# Patient Record
Sex: FEMALE | Race: BLACK OR AFRICAN AMERICAN | URBAN - METROPOLITAN AREA
[De-identification: names, ages, dates, MRNs, and addresses within clinical notes are randomized per-mention and may not be internally consistent; named-entity substitution may affect disease eponyms.]

---

## 2017-06-15 ENCOUNTER — OUTPATIENT (OUTPATIENT)
Dept: OUTPATIENT SERVICES | Facility: HOSPITAL | Age: 47
LOS: 1 days | Discharge: HOME | End: 2017-06-15

## 2017-06-15 DIAGNOSIS — F10.20 ALCOHOL DEPENDENCE, UNCOMPLICATED: ICD-10-CM

## 2017-06-28 DIAGNOSIS — Z00.8 ENCOUNTER FOR OTHER GENERAL EXAMINATION: ICD-10-CM

## 2017-12-11 ENCOUNTER — INPATIENT (INPATIENT)
Facility: HOSPITAL | Age: 47
LOS: 4 days | Discharge: NURSING, REHAB OR LONG-TERM CARE | End: 2017-12-16
Attending: INTERNAL MEDICINE

## 2017-12-11 DIAGNOSIS — F10.20 ALCOHOL DEPENDENCE, UNCOMPLICATED: ICD-10-CM

## 2017-12-16 ENCOUNTER — INPATIENT (INPATIENT)
Facility: HOSPITAL | Age: 47
LOS: 3 days | Discharge: HOME | End: 2017-12-20
Attending: INTERNAL MEDICINE

## 2017-12-16 DIAGNOSIS — F10.20 ALCOHOL DEPENDENCE, UNCOMPLICATED: ICD-10-CM

## 2017-12-20 DIAGNOSIS — M54.9 DORSALGIA, UNSPECIFIED: ICD-10-CM

## 2017-12-20 DIAGNOSIS — F10.20 ALCOHOL DEPENDENCE, UNCOMPLICATED: ICD-10-CM

## 2017-12-20 DIAGNOSIS — F11.20 OPIOID DEPENDENCE, UNCOMPLICATED: ICD-10-CM

## 2017-12-20 DIAGNOSIS — F31.9 BIPOLAR DISORDER, UNSPECIFIED: ICD-10-CM

## 2017-12-20 DIAGNOSIS — F17.210 NICOTINE DEPENDENCE, CIGARETTES, UNCOMPLICATED: ICD-10-CM

## 2017-12-25 DIAGNOSIS — Z51.89 ENCOUNTER FOR OTHER SPECIFIED AFTERCARE: ICD-10-CM

## 2017-12-25 DIAGNOSIS — F10.20 ALCOHOL DEPENDENCE, UNCOMPLICATED: ICD-10-CM

## 2017-12-25 DIAGNOSIS — Z59.0 HOMELESSNESS: ICD-10-CM

## 2017-12-25 DIAGNOSIS — G40.89 OTHER SEIZURES: ICD-10-CM

## 2017-12-25 DIAGNOSIS — F11.20 OPIOID DEPENDENCE, UNCOMPLICATED: ICD-10-CM

## 2017-12-25 DIAGNOSIS — M54.5 LOW BACK PAIN: ICD-10-CM

## 2017-12-25 DIAGNOSIS — F31.9 BIPOLAR DISORDER, UNSPECIFIED: ICD-10-CM

## 2017-12-25 DIAGNOSIS — F14.20 COCAINE DEPENDENCE, UNCOMPLICATED: ICD-10-CM

## 2017-12-25 DIAGNOSIS — F17.210 NICOTINE DEPENDENCE, CIGARETTES, UNCOMPLICATED: ICD-10-CM

## 2017-12-25 DIAGNOSIS — G89.29 OTHER CHRONIC PAIN: ICD-10-CM

## 2017-12-25 SDOH — ECONOMIC STABILITY - HOUSING INSECURITY: HOMELESSNESS: Z59.0

## 2018-01-23 ENCOUNTER — INPATIENT (INPATIENT)
Facility: HOSPITAL | Age: 48
LOS: 2 days | Discharge: HOME | End: 2018-01-26
Attending: INTERNAL MEDICINE

## 2018-01-30 DIAGNOSIS — S83.92XA SPRAIN OF UNSPECIFIED SITE OF LEFT KNEE, INITIAL ENCOUNTER: ICD-10-CM

## 2018-01-30 DIAGNOSIS — S80.02XA CONTUSION OF LEFT KNEE, INITIAL ENCOUNTER: ICD-10-CM

## 2018-01-30 DIAGNOSIS — F17.200 NICOTINE DEPENDENCE, UNSPECIFIED, UNCOMPLICATED: ICD-10-CM

## 2018-01-30 DIAGNOSIS — G40.909 EPILEPSY, UNSPECIFIED, NOT INTRACTABLE, WITHOUT STATUS EPILEPTICUS: ICD-10-CM
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
## 2018-01-30 DIAGNOSIS — F31.9 BIPOLAR DISORDER, UNSPECIFIED: ICD-10-CM

## 2018-01-30 DIAGNOSIS — Z59.0 HOMELESSNESS: ICD-10-CM
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
## 2018-01-30 DIAGNOSIS — Z91.19 PATIENT'S NONCOMPLIANCE WITH OTHER MEDICAL TREATMENT AND REGIMEN: ICD-10-CM
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
## 2018-01-30 DIAGNOSIS — F14.10 COCAINE ABUSE, UNCOMPLICATED: ICD-10-CM

## 2018-01-30 SDOH — ECONOMIC STABILITY - HOUSING INSECURITY: HOMELESSNESS: Z59.0

## 2018-02-04 DIAGNOSIS — G40.909 EPILEPSY, UNSPECIFIED, NOT INTRACTABLE, WITHOUT STATUS EPILEPTICUS: ICD-10-CM

## 2018-05-04 ENCOUNTER — INPATIENT (INPATIENT)
Facility: HOSPITAL | Age: 48
LOS: 3 days | Discharge: HOME | End: 2018-05-08
Attending: INTERNAL MEDICINE | Admitting: INTERNAL MEDICINE

## 2018-05-04 VITALS
DIASTOLIC BLOOD PRESSURE: 80 MMHG | RESPIRATION RATE: 18 BRPM | TEMPERATURE: 98 F | OXYGEN SATURATION: 96 % | SYSTOLIC BLOOD PRESSURE: 135 MMHG | HEART RATE: 105 BPM

## 2018-05-04 DIAGNOSIS — F11.21 OPIOID DEPENDENCE, IN REMISSION: ICD-10-CM

## 2018-05-04 LAB
ALBUMIN SERPL ELPH-MCNC: 3.9 G/DL — SIGNIFICANT CHANGE UP (ref 3.5–5.2)
ALP SERPL-CCNC: 169 U/L — HIGH (ref 30–115)
ALT FLD-CCNC: 209 U/L — HIGH (ref 0–41)
ANION GAP SERPL CALC-SCNC: 15 MMOL/L — HIGH (ref 7–14)
AST SERPL-CCNC: 110 U/L — HIGH (ref 0–41)
BASOPHILS # BLD AUTO: 0.07 K/UL — SIGNIFICANT CHANGE UP (ref 0–0.2)
BASOPHILS NFR BLD AUTO: 0.5 % — SIGNIFICANT CHANGE UP (ref 0–1)
BILIRUB SERPL-MCNC: 0.2 MG/DL — SIGNIFICANT CHANGE UP (ref 0.2–1.2)
BUN SERPL-MCNC: 17 MG/DL — SIGNIFICANT CHANGE UP (ref 10–20)
CALCIUM SERPL-MCNC: 9.2 MG/DL — SIGNIFICANT CHANGE UP (ref 8.5–10.1)
CHLORIDE SERPL-SCNC: 101 MMOL/L — SIGNIFICANT CHANGE UP (ref 98–110)
CO2 SERPL-SCNC: 25 MMOL/L — SIGNIFICANT CHANGE UP (ref 17–32)
CREAT SERPL-MCNC: 0.8 MG/DL — SIGNIFICANT CHANGE UP (ref 0.7–1.5)
EOSINOPHIL # BLD AUTO: 0.21 K/UL — SIGNIFICANT CHANGE UP (ref 0–0.7)
EOSINOPHIL NFR BLD AUTO: 1.6 % — SIGNIFICANT CHANGE UP (ref 0–8)
GLUCOSE SERPL-MCNC: 106 MG/DL — HIGH (ref 70–99)
HCT VFR BLD CALC: 36.9 % — LOW (ref 37–47)
HGB BLD-MCNC: 12.2 G/DL — SIGNIFICANT CHANGE UP (ref 12–16)
IMM GRANULOCYTES NFR BLD AUTO: 0.5 % — HIGH (ref 0.1–0.3)
LYMPHOCYTES # BLD AUTO: 2.66 K/UL — SIGNIFICANT CHANGE UP (ref 1.2–3.4)
LYMPHOCYTES # BLD AUTO: 20.8 % — SIGNIFICANT CHANGE UP (ref 20.5–51.1)
MCHC RBC-ENTMCNC: 29.2 PG — SIGNIFICANT CHANGE UP (ref 27–31)
MCHC RBC-ENTMCNC: 33.1 G/DL — SIGNIFICANT CHANGE UP (ref 32–37)
MCV RBC AUTO: 88.3 FL — SIGNIFICANT CHANGE UP (ref 81–99)
MONOCYTES # BLD AUTO: 0.91 K/UL — HIGH (ref 0.1–0.6)
MONOCYTES NFR BLD AUTO: 7.1 % — SIGNIFICANT CHANGE UP (ref 1.7–9.3)
NEUTROPHILS # BLD AUTO: 8.86 K/UL — HIGH (ref 1.4–6.5)
NEUTROPHILS NFR BLD AUTO: 69.5 % — SIGNIFICANT CHANGE UP (ref 42.2–75.2)
NRBC # BLD: 0 /100 WBCS — SIGNIFICANT CHANGE UP (ref 0–0)
PLATELET # BLD AUTO: 369 K/UL — SIGNIFICANT CHANGE UP (ref 130–400)
POTASSIUM SERPL-MCNC: 3.9 MMOL/L — SIGNIFICANT CHANGE UP (ref 3.5–5)
POTASSIUM SERPL-SCNC: 3.9 MMOL/L — SIGNIFICANT CHANGE UP (ref 3.5–5)
PROT SERPL-MCNC: 7.8 G/DL — SIGNIFICANT CHANGE UP (ref 6–8)
RBC # BLD: 4.18 M/UL — LOW (ref 4.2–5.4)
RBC # FLD: 14.1 % — SIGNIFICANT CHANGE UP (ref 11.5–14.5)
SODIUM SERPL-SCNC: 141 MMOL/L — SIGNIFICANT CHANGE UP (ref 135–146)
WBC # BLD: 12.78 K/UL — HIGH (ref 4.8–10.8)
WBC # FLD AUTO: 12.78 K/UL — HIGH (ref 4.8–10.8)

## 2018-05-04 RX ORDER — NAFCILLIN 10 G/100ML
INJECTION, POWDER, FOR SOLUTION INTRAVENOUS
Qty: 0 | Refills: 0 | Status: DISCONTINUED | OUTPATIENT
Start: 2018-05-05 | End: 2018-05-08

## 2018-05-04 RX ORDER — NAFCILLIN 10 G/100ML
INJECTION, POWDER, FOR SOLUTION INTRAVENOUS
Qty: 0 | Refills: 0 | Status: DISCONTINUED | OUTPATIENT
Start: 2018-05-04 | End: 2018-05-04

## 2018-05-04 RX ORDER — VANCOMYCIN HCL 1 G
2000 VIAL (EA) INTRAVENOUS ONCE
Qty: 0 | Refills: 0 | Status: COMPLETED | OUTPATIENT
Start: 2018-05-04 | End: 2018-05-04

## 2018-05-04 RX ORDER — QUETIAPINE FUMARATE 200 MG/1
25 TABLET, FILM COATED ORAL THREE TIMES A DAY
Qty: 0 | Refills: 0 | Status: DISCONTINUED | OUTPATIENT
Start: 2018-05-04 | End: 2018-05-08

## 2018-05-04 RX ORDER — IBUPROFEN 200 MG
400 TABLET ORAL THREE TIMES A DAY
Qty: 0 | Refills: 0 | Status: DISCONTINUED | OUTPATIENT
Start: 2018-05-04 | End: 2018-05-08

## 2018-05-04 RX ORDER — PHENOBARBITAL 60 MG
1 TABLET ORAL
Qty: 0 | Refills: 0 | COMMUNITY

## 2018-05-04 RX ORDER — QUETIAPINE FUMARATE 200 MG/1
150 TABLET, FILM COATED ORAL AT BEDTIME
Qty: 0 | Refills: 0 | Status: DISCONTINUED | OUTPATIENT
Start: 2018-05-04 | End: 2018-05-08

## 2018-05-04 RX ORDER — NAFCILLIN 10 G/100ML
2 INJECTION, POWDER, FOR SOLUTION INTRAVENOUS EVERY 4 HOURS
Qty: 0 | Refills: 0 | Status: DISCONTINUED | OUTPATIENT
Start: 2018-05-05 | End: 2018-05-08

## 2018-05-04 RX ORDER — OXACILLIN SODIUM 2 G
INTRAVENOUS SOLUTION, PIGGYBACK (EA) INTRAVENOUS
Qty: 0 | Refills: 0 | Status: DISCONTINUED | OUTPATIENT
Start: 2018-05-04 | End: 2018-05-04

## 2018-05-04 RX ORDER — NAFCILLIN 10 G/100ML
2 INJECTION, POWDER, FOR SOLUTION INTRAVENOUS ONCE
Qty: 0 | Refills: 0 | Status: COMPLETED | OUTPATIENT
Start: 2018-05-04 | End: 2018-05-04

## 2018-05-04 RX ORDER — LEVETIRACETAM 250 MG/1
2.5 TABLET, FILM COATED ORAL
Qty: 0 | Refills: 0 | COMMUNITY

## 2018-05-04 RX ORDER — TRAMADOL HYDROCHLORIDE 50 MG/1
50 TABLET ORAL THREE TIMES A DAY
Qty: 0 | Refills: 0 | Status: DISCONTINUED | OUTPATIENT
Start: 2018-05-04 | End: 2018-05-08

## 2018-05-04 RX ORDER — NICOTINE POLACRILEX 2 MG
1 GUM BUCCAL DAILY
Qty: 0 | Refills: 0 | Status: DISCONTINUED | OUTPATIENT
Start: 2018-05-04 | End: 2018-05-08

## 2018-05-04 RX ORDER — PANTOPRAZOLE SODIUM 20 MG/1
40 TABLET, DELAYED RELEASE ORAL
Qty: 0 | Refills: 0 | Status: DISCONTINUED | OUTPATIENT
Start: 2018-05-04 | End: 2018-05-08

## 2018-05-04 RX ORDER — QUETIAPINE FUMARATE 200 MG/1
1 TABLET, FILM COATED ORAL
Qty: 0 | Refills: 0 | COMMUNITY

## 2018-05-04 RX ORDER — LIDOCAINE 4 G/100G
1 CREAM TOPICAL DAILY
Qty: 0 | Refills: 0 | Status: DISCONTINUED | OUTPATIENT
Start: 2018-05-04 | End: 2018-05-08

## 2018-05-04 RX ORDER — ENOXAPARIN SODIUM 100 MG/ML
40 INJECTION SUBCUTANEOUS EVERY 24 HOURS
Qty: 0 | Refills: 0 | Status: DISCONTINUED | OUTPATIENT
Start: 2018-05-04 | End: 2018-05-08

## 2018-05-04 RX ORDER — LEVETIRACETAM 250 MG/1
500 TABLET, FILM COATED ORAL
Qty: 0 | Refills: 0 | Status: DISCONTINUED | OUTPATIENT
Start: 2018-05-04 | End: 2018-05-08

## 2018-05-04 RX ADMIN — Medication 250 MILLIGRAM(S): at 22:16

## 2018-05-04 RX ADMIN — NAFCILLIN 200 GRAM(S): 10 INJECTION, POWDER, FOR SOLUTION INTRAVENOUS at 21:46

## 2018-05-04 NOTE — ED PROVIDER NOTE - OBJECTIVE STATEMENT
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 Pt states she was being treated for the staph infection and is due to receive IV abx through May 21 at which point she will need a valve Pt left [facility] because her IV came out and she wasn't happy with how long it was taking them to place a PICC line. Pt also states they have not been treating her Hep C.

## 2018-05-04 NOTE — H&P ADULT - NSHPREVIEWOFSYSTEMS_GEN_ALL_CORE
REVIEW OF SYSTEMS:    CONSTITUTIONAL: No weakness or fevers  EYES/ENT: No visual changes  NECK: No pain or stiffness  RESPIRATORY: No cough, wheezing, hemoptysis; No shortness of breath  CARDIOVASCULAR: No chest pain or palpitations, no lower extremity edema  GASTROINTESTINAL: No abdominal pain. No nausea or vomiting; No diarrhea or constipation. No bloody or black colored stool  GENITOURINARY: No pain with urination, no increased urinary frequency, no dark o r bloody urine  NEUROLOGICAL: No numbness or weakness  SKIN: No itching, rashes

## 2018-05-04 NOTE — H&P ADULT - ATTENDING COMMENTS
Agree with resident's note, HPI, PE, assessment and plan.  Pt was seen and examined independently.     Record from [facility] reviewed.    48/F hx of IVDU and cocaine use,  Bipolar d/o, seizure d/o, Hep C, recent admission (discharged 05/03) to [facility] found to have IE (TV) and left AMA b/c were delaying PICC line placement. Pt states right after she left the hospital she had another fever and decided to come to another hospital.   Currently no complaints, denies cough, no more fever, chills.     ROS: + RUQ pain, rest of ROS negative.    PMHx, FHx, Surgical Hx, Social Hx - as documented in resident note    T(C): 36  HR: 114  BP: 134/57  RR: 20  SpO2: 96%    Physical exam:  GENERAL: NAD, not toxic looking  HEENT: PERRL, no Li's spots  NECK: supple, no JVD  RESP: CTA b/l, no crackles, rhonchi  CVS: S1S2,RRR  GI: abdomen soft NT, ND  Extremities: no c/c/edema, no endocarditis stigmata  NEURO: AOx3, no focal deficit  H/L: no enlarged LN noted     LABS:                       11.1   9.32  )-----------( 337      ( 05 May 2018 06:47 )             33.4   05-05    139  |  102  |  18  ----------------------------<  103<H>  4.1   |  23  |  0.7    Ca    8.5      05 May 2018 06:47  Phos  3.9     05-05  Mg     1.8     05-05    TPro  6.3  /  Alb  3.2<L>  /  TBili  0.2  /  DBili  x   /  AST  65<H>  /  ALT  148<H>  /  AlkPhos  147<H>  05-05    CXR: Right middle lobe opacity, likely infectious or inflammatory. Follow-up   after treatment is recommended to demonstrate resolution.  CT chest from [facility]: septic embolies     EKG: not done    A/P: 1. IE in IVDU user, confirmed with VIOLETA.   2. Bacteremia with MSSA  - repeat BCx x2  - cont Nafcillin  - ID eval  3. IVDU - avoid IV opioids, utox  4. Newly diagnosed Hep C, treatment naive (as per pt) - GI eval as OP  - US abd/liver  - AFP  5.  Seizure disorder - cont home meds  6. Bipolar disorder - cont home meds    DVT ppx.

## 2018-05-04 NOTE — H&P ADULT - REASON FOR ADMISSION
Pt states she was admitted Pt at [facility] for IV abx due to endocarditis, she left AMA due to their inability to obtain IV access and they refused to place PICC line.

## 2018-05-04 NOTE — H&P ADULT - NSHPPHYSICALEXAM_GEN_ALL_CORE
· Temp (F): 98.5  · Temp (C) Temp (C): 36.9  · Temp site Temp Site: oral  · Heart Rate Heart Rate (beats/min): 105  · Heart Rate Method: noninvasive blood pressure monitor  · BP Systolic Systolic: 135  · BP Diastolic Diastolic (mm Hg): 80  · Blood Pressure - Site Site: left upper arm  · Blood Pressure - Method Method: electronic  · Respiration Rate (breaths/min) Respiration Rate (breaths/min): 18  · SpO2 (%) SpO2 (%): 96  · O2 delivery Patient On: room air · Temp (F): 98.5  · Temp (C) Temp (C): 36.9  · Temp site Temp Site: oral  · Heart Rate Heart Rate (beats/min): 105  · Heart Rate Method: noninvasive blood pressure monitor  · BP Systolic Systolic: 135  · BP Diastolic Diastolic (mm Hg): 80  · Blood Pressure - Site Site: left upper arm  · Blood Pressure - Method Method: electronic  · Respiration Rate (breaths/min) Respiration Rate (breaths/min): 18  · SpO2 (%) SpO2 (%): 96  · O2 delivery Patient On: room air    PHYSICAL EXAM:  GENERAL: NAD, speaks in full sentences, no signs of respiratory distress  HEAD:  Atraumatic, Normocephalic  EYES: EOMI, PERRLA, conjunctiva and sclera clear  NECK: Supple, No JVD  CHEST/LUNG: Clear to auscultation bilaterally; No wheeze; No crackles; No accessory muscles used  HEART: Regular rate and rhythm; No murmurs;   ABDOMEN: Soft, mild ruq tenderness, Nondistended; Bowel sounds present; No guarding  EXTREMITIES:  2+ Peripheral Pulses, No cyanosis or edema  PSYCH: AAOx3  NEUROLOGY: non-focal  SKIN: No rashes or lesions

## 2018-05-04 NOTE — ED ADULT NURSE NOTE - CHIEF COMPLAINT QUOTE
Pt states she was admitted Pt at [facility] for IV abx due to endocarditis, she left AMA due to their inability to obtain IV access and they refused to place PICC line.

## 2018-05-04 NOTE — H&P ADULT - ASSESSMENT
48/F hx of Bipolar d/o, seizure d/o, Hep C, recent admission to [facility] found to have IE (TV) and left AMA. Admit to medicine for management of her IE, found to have acute transaminitis    # Infectious Endocarditis likely 2/2 IVDU  - Mild Leukoocytosis  - Check ESR & CRP  - Check TTE  - Check HIV screen  - Check Blood Cx  - ID evaluation    # RML opacity    # Transaminitis with Hx of Hep C  - 2017 AST/ALT (28/20)  - Check RUQ sono  - Albumin WNR  - Repeat levels; check coags daily  - Hep Panel ABC (2017) - neg    # Seizure d/o  - C/w Keppra & Phenobarbital     # Bipolar d/o  - C/w seroquel & paxil    # IVDU  - check ur & drug screen  - Prev screen positive: Bz & cocaine & THC & Opiates    Ambulate as tolerated  DVT ppx  Regular Diet  Full Code 48/F hx of IVDU (heroine last 12/2017) and cocaine use (last 3 days PTP),  Bipolar d/o, seizure d/o, Hep C, recent admission to [facility] found to have IE (TV) and left AMA b/c were delaying PICC line placement.    # Infectious Endocarditis likely 2/2 IVDU  - [facility] documented bld cx MSSA sensitive to Oxacillin (<=0.25); doc on 2 blood cx  - VIOLETA report from [facility] =   - Per [facility]: Rx with Oxacillin 2g IV q4H: 1st dose 4/9/18, last dose 5/3/18, rec 6wk course to be completed on 5/21/18   - Mild Leukoocytosis  - Check ESR & CRP  - Check TTE  - Check HIV screen  - Check Blood Cx  - ID evaluation    # RML opacity    # Transaminitis with Hx of Hep C  - 2017 AST/ALT (28/20)  - Check RUQ sono  - Albumin WNR  - Repeat levels; check coags daily  - Hep Panel ABC (2017) - neg    # Seizure d/o  - C/w Keppra & Phenobarbital     # Bipolar d/o  - C/w seroquel & paxil    # IVDU  - check ur & drug screen  - Prev screen positive: Bz & cocaine & THC & Opiates    Ambulate as tolerated  DVT ppx  Regular Diet  Full Code 48/F hx of IVDU (heroine last 12/2017) and cocaine use (last 3 days PTP),  Bipolar d/o, seizure d/o, Hep C, recent admission to [facility] (4/8/2018) found to have IE (TV) and left AMA (5/3/18) b/c were delaying PICC line placement. Admit to Medicine for management MSSA bacteremia w/ Tricuspid Valve IE & Hep C & Transaminitis    # Infectious Endocarditis likely 2/2 IVDU  - [facility] documented bld cx MSSA sensitive to Oxacillin (<=0.25); doc on 2 blood cx  - VIOLETA report from [facility] (4/26/18) = Tricupid valve - 4+ TR (Severe). Erratically moving, tissue density mass on TV c/w vegetation. Flail tricuspid valve leaflet  - Per [facility]: Rx with Oxacillin 2g IV q4H for native valve IE 2/2 MSSA: 1st dose 4/9/18, last dose 5/3/18, rec 6wk course to be completed on 5/21/18   - Mild Leukoocytosis  - Check ESR & CRP  - Check TTE for imaging  - Check HIV screen  - Check Blood Cx  - ID evaluation  - Consider CT surgery evaluation     # Tooth extraction while at [facility] (5/1/18) for long term tooth pain & possible source of infection  - C/w Ketorolac & naproxen    # RML opacity    # Transaminitis with Hx of Hep C  - 2017 AST/ALT (28/20)  - Check RUQ sono  - Albumin WNR  - Repeat levels; check coags daily  - Hep Panel ABC (2017) - neg    # Seizure d/o  - C/w Keppra & Phenobarbital     # Bipolar d/o  - C/w seroquel & paxil    # IVDU Hx   - Per [facility] had +UDS for barbituates  - Last stated cocaine use was 5/1/18 & last Heroine use (IV) 12/2017  - check ur & drug screen  - Prev screen positive: Bz & cocaine & THC & Opiates    She was seen by psychiatry at [facility] and deemed to have capacity to leave AMA.  Copy of [facility] documents copied into chart.    Ambulate as tolerated  DVT ppx  Regular Diet  Full Code 48/F hx of IVDU (heroine last 12/2017) and cocaine use (last 3 days PTP),  Bipolar d/o, seizure d/o, Hep C, recent admission to [facility] (4/8/2018) found to have IE (TV) and left AMA (5/3/18) b/c were delaying PICC line placement. Admit to Medicine for management MSSA bacteremia w/ Tricuspid Valve IE & Hep C & Transaminitis    # Infectious Endocarditis likely 2/2 IVDU  - [facility] documented bld cx MSSA sensitive to Oxacillin (<=0.25); doc on 2 blood cx; prog note at [facility] has doc 3 days of negative blood cx  - VIOLETA report from [facility] (4/26/18) = Tricupid valve - 4+ TR (Severe). Erratically moving, tissue density mass on TV c/w vegetation. Flail tricuspid valve leaflet  - Per [facility]: Rx with Oxacillin 2g IV q4H for native valve IE 2/2 MSSA: 1st dose 4/9/18, last dose 5/3/18, rec 6wk course to be completed on 5/21/18   - Mild Leukoocytosis  - Check ESR & CRP  - Check TTE for imaging  - Check HIV screen  - Check Blood Cx  - ID evaluation  - Consider CT surgery evaluation     # Tooth extraction while at [facility] (5/1/18) for long term tooth pain & possible source of infection  - C/w Ketorolac & motrin    # RML opacity - no respiratory symptoms, [facility] doc shows had septic emboli to b/l lungs   - CXR - Right middle lobe opacity, likely infectious or inflammatory    # Transaminitis with Hx of Hep C  - 2017 AST/ALT (28/20); labs from 5/3/18 ALT/AST (191/79)  - Check RUQ sono  - Albumin WNR  - Repeat levels; check coags daily  - Hep Panel ABC (2017) - neg    # Seizure d/o  - C/w Keppra; was prev on Phenobarbital, but not per [facility] tx plan    # Bipolar d/o  - C/w seroquel & paxil    # IVDU Hx   - Per [facility] had +UDS for barbituates & cocaine  - Last stated cocaine use was 5/1/18 & last Heroine use (IV) 12/2017  - check ur & drug screen  - Prev screen positive: Bz & cocaine & THC & Opiates  - Counciled on avoidance of recreational drugs    # Counciled on smoking cessation  - C/w Nicotine patch from [facility] - 14-mg patch once daily for 2 weeks (ordered - ends 5/18) , then start 7-mg patch once daily for 2 weeks.    She was seen by psychiatry at [facility] and deemed to have capacity to leave AMA.  Also documented, BF banned from seeing pt & pt banned from leaving floor - concerned for drug use in the hospital. Monitor and set limits as appropriat  Copy of [facility] documents copied into chart.    Ambulate as tolerated  DVT ppx  GI ppx  Regular Diet  Full Code 48/F hx of IVDU (heroine last 12/2017) and cocaine use (last 3 days PTP),  Bipolar d/o, seizure d/o, Hep C, recent admission to [facility] (4/8/2018) found to have IE (TV) and left AMA (5/3/18) b/c were delaying PICC line placement. Admit to Medicine for management MSSA bacteremia w/ Tricuspid Valve IE & Hep C & Transaminitis    # Infectious Endocarditis likely 2/2 IVDU  - [facility] documented bld cx MSSA sensitive to Oxacillin (<=0.25); doc on 2 blood cx; prog note at [facility] has doc 3 days of negative blood cx  - VIOLETA report from [facility] (4/26/18) = Tricupid valve - 4+ TR (Severe). Erratically moving, tissue density mass on TV c/w vegetation. Flail tricuspid valve leaflet  - Per [facility]: Rx with Oxacillin 2g IV q4H for native valve IE 2/2 MSSA: 1st dose 4/9/18, last dose 5/3/18, rec 6wk course to be completed on 5/21/18   - Mild Leukoocytosis  - Check ESR & CRP  - Check TTE for imaging  - Check HIV screen  - Check Blood Cx  - ID evaluation  - Consider CT surgery evaluation     # Tooth extraction while at [facility] (5/1/18) for long term tooth pain & possible source of infection  - C/w Ketorolac & motrin    # RML opacity - no respiratory symptoms, [facility] doc shows had septic emboli to b/l lungs   - CXR - Right middle lobe opacity, likely infectious or inflammatory    # Transaminitis with Hx of Hep C  - 2017 AST/ALT (28/20); labs from 5/3/18 ALT/AST (191/79)  - Check RUQ sono  - Albumin WNR  - Repeat levels; check coags daily  - Hep Panel ABC (2017) - neg    # Seizure d/o  - C/w Keppra; was prev on Phenobarbital, but not per [facility] tx plan    # Bipolar d/o  - C/w seroquel & paxil  - EKG ordered for QTc    # IVDU Hx   - Per [facility] had +UDS for barbituates & cocaine  - Last stated cocaine use was 5/1/18 & last Heroine use (IV) 12/2017  - check ur & drug screen  - Prev screen positive: Bz & cocaine & THC & Opiates  - Counciled on avoidance of recreational drugs    # Counciled on smoking cessation  - C/w Nicotine patch from [facility] - 14-mg patch once daily for 2 weeks (ordered - ends 5/18) , then start 7-mg patch once daily for 2 weeks.    She was seen by psychiatry at [facility] and deemed to have capacity to leave AMA.  Also documented, BF banned from seeing pt & pt banned from leaving floor - concerned for drug use in the hospital. Monitor and set limits as appropriat  Copy of [facility] documents copied into chart.    Ambulate as tolerated  DVT ppx  GI ppx  Regular Diet  Full Code

## 2018-05-04 NOTE — H&P ADULT - NSHPLABSRESULTS_GEN_ALL_CORE
12.2   12.78 )-----------( 369      ( 04 May 2018 19:56 )             36.9       05-04    141  |  101  |  17  ----------------------------<  106<H>  3.9   |  25  |  0.8    Ca    9.2      04 May 2018 19:56    TPro  7.8  /  Alb  3.9  /  TBili  0.2  /  DBili  x   /  AST  110<H>  /  ALT  209<H>  /  AlkPhos  169<H>  05-04    CXR - Right middle lobe opacity, likely infectious or inflammatory 12.2   12.78 )-----------( 369      ( 04 May 2018 19:56 )             36.9       05-04    141  |  101  |  17  ----------------------------<  106<H>  3.9   |  25  |  0.8    Ca    9.2      04 May 2018 19:56    TPro  7.8  /  Alb  3.9  /  TBili  0.2  /  DBili  x   /  AST  110<H>  /  ALT  209<H>  /  AlkPhos  169<H>  05-04    CXR - Right middle lobe opacity, likely infectious or inflammatory    VIOLETA (4/26/18): EF 60-65%  Aortic valve - No AR. AV opening NML. No AV vegitation.   Mitral valve - trace MR. No vegetations.  Tricupid valve - 4+ TR (Severe). Erratically moving, tissue density mass on TV c/w vegetation. Flail tricuspid valve leaflet  Pulmonic valve - NML  Small pericardial effusion. Mild Pulm HTN. 12.2   12.78 )-----------( 369      ( 04 May 2018 19:56 )             36.9       05-04    141  |  101  |  17  ----------------------------<  106<H>  3.9   |  25  |  0.8    Ca    9.2      04 May 2018 19:56    TPro  7.8  /  Alb  3.9  /  TBili  0.2  /  DBili  x   /  AST  110<H>  /  ALT  209<H>  /  AlkPhos  169<H>  05-04    CXR - Right middle lobe opacity, likely infectious or inflammatory    VIOLETA (4/26/18):   EF 60-65%  Aortic valve - No AR. AV opening NML. No AV vegitation.   Mitral valve - trace MR. No vegetations.  Tricupid valve - 4+ TR (Severe). Erratically moving, tissue density mass on TV c/w vegetation. Flail tricuspid valve leaflet  Pulmonic valve - NML  Small pericardial effusion. Mild Pulm HTN.

## 2018-05-04 NOTE — ED ADULT TRIAGE NOTE - CHIEF COMPLAINT QUOTE
Pt states she was admitted Pt at [facility] for IV abx due to endocarditis, she left AMA due to their inability to obtain IV access and they refused to place PICC line.

## 2018-05-04 NOTE — ED ADULT NURSE NOTE - PMH
Bipolar disorder, current episode mixed, severe, without psychotic features    Hep C w/o coma, chronic    IV drug user    Seizure

## 2018-05-04 NOTE — PATIENT PROFILE ADULT. - NSTOBACCOWITHDRW_GEN_A_CORE_SD
depression/difficulty concentrating/headache/insomnia/intense desire for tobacco/irritability/nausea/bowel pattern changes/drowsiness/increased appetite

## 2018-05-04 NOTE — ED ADULT NURSE NOTE - OBJECTIVE STATEMENT
Pt was a patient at [facility] since April 9, 2018 for chest pain, dx with endocarditis. Pt had several IV's which became infiltrated, as per pt. Pt was Pt aox3 was a patient at [facility] since April 9, 2018 for chest pain, dx with endocarditis. Pt had several IV's which became infiltrated, as per pt. Pt states she was requesting a PICC line and it was not placed, so pt left AMA. Pt c/o mild chest pain, in no acute distress, will continue to monitor the pt.

## 2018-05-04 NOTE — ED PROVIDER NOTE - NS ED ROS FT
Constitutional: See HPI.  Eyes: No visual changes, eye pain or discharge.  ENMT: No hearing changes, pain, discharge or infections. No neck pain or stiffness.  Cardiac: +R chest pain. No SOB or edema. No chest pain with exertion.  Respiratory: No cough or respiratory distress.   GI: No nausea, vomiting, diarrhea. +RUQ abdominal pain.  : No dysuria, frequency or burning.  MS: No myalgia, muscle weakness, joint pain. +R back pain.  Neuro: No headache or weakness. No LOC.  Skin: No skin rash.

## 2018-05-04 NOTE — ED PROVIDER NOTE - PHYSICAL EXAMINATION
AOx4, Non toxic appearing, NAD, speaking in full sentences. Skin  warm and dry, no acute rash. Head normocephalic, atraumatic. Conjunctiva and sclera clear. MM moist, no nasal discharge.  Pharynx and TM's unremarkable.  No mastoid or temporal ttp. Neck supple nt, no meningeal signs. Heart RRR s1s2 nl, no rub/murmur. Lungs- No retractions, BS equal, CTAB. Abdomen soft mildy ttp in RUQ. Extremities- sensation wnl, normal ROM. No LE edema, calves nttp b/l.

## 2018-05-04 NOTE — H&P ADULT - HISTORY OF PRESENT ILLNESS
48/F hx of IVDU,  Bipolar d/o, seizure d/o, Hep C, recent admission to [facility] beds at the [facility] found to have IE (TV) and left AMA. Admit to medicine for management of her IE 48/F hx of IVDU (heroine last 12/2017) and cocaine use (last 3 days PTP),  Bipolar d/o, seizure d/o, Hep C, recent admission to [facility] found to have IE (TV) and left AMA b/c were delaying PICC line placement. 48/F hx of IVDU (heroine last 12/2017) and cocaine use (last 3 days PTP),  Bipolar d/o, seizure d/o, Hep C, recent admission to [facility] found to have IE (TV) and left AMA b/c were delaying PICC line placement.     In the beginning 4/2018 - pt had a spider bite to right foot which turned into an infection at the site (now resolved), then had fevers and chest pain and N/V. Went to [facility] for evaluation, had VIOLETA done and was diagnosed with MSSA bacteremia complicated by TV IE and septic emboli to the lungs. She was started on Oxacillin, though left AMA bc PICC line was being "delayed" for no reason.    [facility]    [facility] to seek medical care, and this is why she came to the ED at [facility].

## 2018-05-04 NOTE — H&P ADULT - NSHPSOCIALHISTORY_GEN_ALL_CORE
20 PY smoker, smokes 1/2 ppd now  No ETOH  Active cocaine user (last was ~5/1/18)  Ex- IVDU (heroine) - last use 12/2017  Lives with mckenzie in [facility]

## 2018-05-04 NOTE — ED PROVIDER NOTE - PROGRESS NOTE DETAILS
I personally evaluated the patient. I reviewed the Resident’s or Physician Assistant’s note (as assigned above), and agree with the findings and plan except as documented in my note.   49 Y/O F HEP C, SEIZURE D/O, BIPOLAR D/O, + SMOKER, H/O IVDA, ADMITTED 4/3 TO [facility] WITH FEVERS, CP AND SOB. PT DIAGNOSED WITH MSSA TRICUSPID ENDOCARDITIS, BACTEREMIA AND SEPTIC EMBOLI IN B/L LUNGS. PT SIGNED OUT AMA TODAY AFTER SHE WAS FRUSTRATED THAT A PICC LINE WASN'T BEING PLACED FOR ABX AT HOME. NO CP, SOB. NO FEVER, CHILLS. VITALS NOTED. ALERT OX3 NAD WELL APPEARING. NECK SUPPLE. NO JVD. LUNGS CLEAR B/L. RRR NO MURMUR. ABD- SOFT NONTENDER. NO RASH. NEURO EXAM NONFOCAL. Admit to hospitalist per Dr Jackson

## 2018-05-05 LAB
ALBUMIN SERPL ELPH-MCNC: 3.2 G/DL — LOW (ref 3.5–5.2)
ALP SERPL-CCNC: 147 U/L — HIGH (ref 30–115)
ALT FLD-CCNC: 148 U/L — HIGH (ref 0–41)
ANION GAP SERPL CALC-SCNC: 14 MMOL/L — SIGNIFICANT CHANGE UP (ref 7–14)
APTT BLD: 24.8 SEC — LOW (ref 27–39.2)
AST SERPL-CCNC: 65 U/L — HIGH (ref 0–41)
BASOPHILS # BLD AUTO: 0.07 K/UL — SIGNIFICANT CHANGE UP (ref 0–0.2)
BASOPHILS NFR BLD AUTO: 0.8 % — SIGNIFICANT CHANGE UP (ref 0–1)
BILIRUB SERPL-MCNC: 0.2 MG/DL — SIGNIFICANT CHANGE UP (ref 0.2–1.2)
BUN SERPL-MCNC: 18 MG/DL — SIGNIFICANT CHANGE UP (ref 10–20)
CALCIUM SERPL-MCNC: 8.5 MG/DL — SIGNIFICANT CHANGE UP (ref 8.5–10.1)
CHLORIDE SERPL-SCNC: 102 MMOL/L — SIGNIFICANT CHANGE UP (ref 98–110)
CO2 SERPL-SCNC: 23 MMOL/L — SIGNIFICANT CHANGE UP (ref 17–32)
CREAT SERPL-MCNC: 0.7 MG/DL — SIGNIFICANT CHANGE UP (ref 0.7–1.5)
CRP SERPL-MCNC: 0.4 MG/DL — SIGNIFICANT CHANGE UP (ref 0–0.4)
EOSINOPHIL # BLD AUTO: 0.24 K/UL — SIGNIFICANT CHANGE UP (ref 0–0.7)
EOSINOPHIL NFR BLD AUTO: 2.6 % — SIGNIFICANT CHANGE UP (ref 0–8)
ERYTHROCYTE [SEDIMENTATION RATE] IN BLOOD: 52 MM/HR — HIGH (ref 0–15)
GLUCOSE SERPL-MCNC: 103 MG/DL — HIGH (ref 70–99)
HCT VFR BLD CALC: 33.4 % — LOW (ref 37–47)
HGB BLD-MCNC: 11.1 G/DL — LOW (ref 12–16)
IMM GRANULOCYTES NFR BLD AUTO: 0.8 % — HIGH (ref 0.1–0.3)
INR BLD: 0.95 RATIO — SIGNIFICANT CHANGE UP (ref 0.65–1.3)
LYMPHOCYTES # BLD AUTO: 2.27 K/UL — SIGNIFICANT CHANGE UP (ref 1.2–3.4)
LYMPHOCYTES # BLD AUTO: 24.4 % — SIGNIFICANT CHANGE UP (ref 20.5–51.1)
MAGNESIUM SERPL-MCNC: 1.8 MG/DL — SIGNIFICANT CHANGE UP (ref 1.8–2.4)
MCHC RBC-ENTMCNC: 29.4 PG — SIGNIFICANT CHANGE UP (ref 27–31)
MCHC RBC-ENTMCNC: 33.2 G/DL — SIGNIFICANT CHANGE UP (ref 32–37)
MCV RBC AUTO: 88.6 FL — SIGNIFICANT CHANGE UP (ref 81–99)
MONOCYTES # BLD AUTO: 0.8 K/UL — HIGH (ref 0.1–0.6)
MONOCYTES NFR BLD AUTO: 8.6 % — SIGNIFICANT CHANGE UP (ref 1.7–9.3)
NEUTROPHILS # BLD AUTO: 5.87 K/UL — SIGNIFICANT CHANGE UP (ref 1.4–6.5)
NEUTROPHILS NFR BLD AUTO: 62.8 % — SIGNIFICANT CHANGE UP (ref 42.2–75.2)
PHOSPHATE SERPL-MCNC: 3.9 MG/DL — SIGNIFICANT CHANGE UP (ref 2.1–4.9)
PLATELET # BLD AUTO: 337 K/UL — SIGNIFICANT CHANGE UP (ref 130–400)
POTASSIUM SERPL-MCNC: 4.1 MMOL/L — SIGNIFICANT CHANGE UP (ref 3.5–5)
POTASSIUM SERPL-SCNC: 4.1 MMOL/L — SIGNIFICANT CHANGE UP (ref 3.5–5)
PROT SERPL-MCNC: 6.3 G/DL — SIGNIFICANT CHANGE UP (ref 6–8)
PROTHROM AB SERPL-ACNC: 10.3 SEC — SIGNIFICANT CHANGE UP (ref 9.95–12.87)
RBC # BLD: 3.77 M/UL — LOW (ref 4.2–5.4)
RBC # FLD: 14.1 % — SIGNIFICANT CHANGE UP (ref 11.5–14.5)
SODIUM SERPL-SCNC: 139 MMOL/L — SIGNIFICANT CHANGE UP (ref 135–146)
WBC # BLD: 9.32 K/UL — SIGNIFICANT CHANGE UP (ref 4.8–10.8)
WBC # FLD AUTO: 9.32 K/UL — SIGNIFICANT CHANGE UP (ref 4.8–10.8)

## 2018-05-05 RX ORDER — QUETIAPINE FUMARATE 200 MG/1
150 TABLET, FILM COATED ORAL ONCE
Qty: 0 | Refills: 0 | Status: DISCONTINUED | OUTPATIENT
Start: 2018-05-05 | End: 2018-05-05

## 2018-05-05 RX ORDER — QUETIAPINE FUMARATE 200 MG/1
125 TABLET, FILM COATED ORAL ONCE
Qty: 0 | Refills: 0 | Status: COMPLETED | OUTPATIENT
Start: 2018-05-05 | End: 2018-05-05

## 2018-05-05 RX ORDER — LIDOCAINE HCL 20 MG/ML
10 VIAL (ML) INJECTION ONCE
Qty: 0 | Refills: 0 | Status: COMPLETED | OUTPATIENT
Start: 2018-05-05 | End: 2018-05-05

## 2018-05-05 RX ADMIN — TRAMADOL HYDROCHLORIDE 50 MILLIGRAM(S): 50 TABLET ORAL at 11:20

## 2018-05-05 RX ADMIN — LEVETIRACETAM 500 MILLIGRAM(S): 250 TABLET, FILM COATED ORAL at 19:14

## 2018-05-05 RX ADMIN — LIDOCAINE 1 PATCH: 4 CREAM TOPICAL at 22:47

## 2018-05-05 RX ADMIN — TRAMADOL HYDROCHLORIDE 50 MILLIGRAM(S): 50 TABLET ORAL at 11:50

## 2018-05-05 RX ADMIN — PANTOPRAZOLE SODIUM 40 MILLIGRAM(S): 20 TABLET, DELAYED RELEASE ORAL at 08:14

## 2018-05-05 RX ADMIN — QUETIAPINE FUMARATE 125 MILLIGRAM(S): 200 TABLET, FILM COATED ORAL at 05:05

## 2018-05-05 RX ADMIN — ENOXAPARIN SODIUM 40 MILLIGRAM(S): 100 INJECTION SUBCUTANEOUS at 03:16

## 2018-05-05 RX ADMIN — QUETIAPINE FUMARATE 25 MILLIGRAM(S): 200 TABLET, FILM COATED ORAL at 19:42

## 2018-05-05 RX ADMIN — LIDOCAINE 1 PATCH: 4 CREAM TOPICAL at 11:20

## 2018-05-05 RX ADMIN — Medication 10 MILLIGRAM(S): at 11:21

## 2018-05-05 RX ADMIN — TRAMADOL HYDROCHLORIDE 50 MILLIGRAM(S): 50 TABLET ORAL at 22:46

## 2018-05-05 RX ADMIN — Medication 1 PATCH: at 11:20

## 2018-05-05 RX ADMIN — TRAMADOL HYDROCHLORIDE 50 MILLIGRAM(S): 50 TABLET ORAL at 03:19

## 2018-05-05 RX ADMIN — NAFCILLIN 200 GRAM(S): 10 INJECTION, POWDER, FOR SOLUTION INTRAVENOUS at 21:11

## 2018-05-05 RX ADMIN — Medication 10 MILLILITER(S): at 18:36

## 2018-05-05 RX ADMIN — NAFCILLIN 200 GRAM(S): 10 INJECTION, POWDER, FOR SOLUTION INTRAVENOUS at 19:14

## 2018-05-05 RX ADMIN — QUETIAPINE FUMARATE 25 MILLIGRAM(S): 200 TABLET, FILM COATED ORAL at 03:16

## 2018-05-05 RX ADMIN — QUETIAPINE FUMARATE 150 MILLIGRAM(S): 200 TABLET, FILM COATED ORAL at 21:11

## 2018-05-05 RX ADMIN — LEVETIRACETAM 500 MILLIGRAM(S): 250 TABLET, FILM COATED ORAL at 05:07

## 2018-05-05 NOTE — CONSULT NOTE ADULT - ASSESSMENT
Patient is a 48y old  Female with h/o IVDA who presents with a chief complaint of Pt states she was admitted Pt at [facility] for IV abx due to endocarditis, she left AMA due to their inability to obtain IV access and they refused to place PICC line. (04 May 2018 21:31). She has started on Nafcillin and The ID consult requested to assist with further evaluation and antibiotic management.    # IDDA  # MSSA bacteremia  # IE    Would recommend:  1. Obtain Blood cultures X 2  2. Obtain Medical records from [facility]  3. Continue IV Nafcillin  4. Need total of 6 weeks of IV antibiotic    d/w patient    will follow the patient with you and make further recommendation based on the clinical course and Lab results  Thank you for the opportunity to participate in Ms. LOPEZ's care

## 2018-05-05 NOTE — CONSULT NOTE ADULT - SUBJECTIVE AND OBJECTIVE BOX
Patient is a 48y old  Female who presents with a chief complaint of Pt states she was admitted Pt at [facility] for IV abx due to endocarditis, she left AMA due to their inability to obtain IV access and they refused to place PICC line. (04 May 2018 21:31)      INTERVAL HPI/OVERNIGHT EVENTS:  T(C): 36 (05-05-18 @ 13:40), Max: 36.6 (05-05-18 @ 05:24)  HR: 114 (05-05-18 @ 13:40) (97 - 114)  BP: 134/57 (05-05-18 @ 13:40) (108/57 - 167/70)  RR: 20 (05-05-18 @ 13:40) (20 - 20)  SpO2: --  Wt(kg): --  I&O's Summary      PAST MEDICAL & SURGICAL HISTORY:  IV drug user  Seizure  Bipolar disorder, current episode mixed, severe, without psychotic features  Hep C w/o coma, chronic  No significant past surgical history      SOCIAL HISTORY  Alcohol:  Tobacco:  Illicit substance use:      FAMILY HISTORY:      LABS:                        11.1   9.32  )-----------( 337      ( 05 May 2018 06:47 )             33.4     05-05    139  |  102  |  18  ----------------------------<  103<H>  4.1   |  23  |  0.7    Ca    8.5      05 May 2018 06:47  Phos  3.9     05-05  Mg     1.8     05-05    TPro  6.3  /  Alb  3.2<L>  /  TBili  0.2  /  DBili  x   /  AST  65<H>  /  ALT  148<H>  /  AlkPhos  147<H>  05-05    PT/INR - ( 05 May 2018 06:47 )   PT: 10.30 sec;   INR: 0.95 ratio         PTT - ( 05 May 2018 06:47 )  PTT:24.8 sec    CAPILLARY BLOOD GLUCOSE                MEDICATIONS  (STANDING):  enoxaparin Injectable 40 milliGRAM(s) SubCutaneous every 24 hours  levETIRAcetam 500 milliGRAM(s) Oral two times a day  lidocaine   Patch 1 Patch Transdermal daily  nafcillin  IVPB 2 Gram(s) IV Intermittent once  nafcillin  IVPB 2 Gram(s) IV Intermittent every 4 hours  nafcillin  IVPB      nicotine -  14 mG/24Hr(s) Patch 1 patch Transdermal daily  pantoprazole    Tablet 40 milliGRAM(s) Oral before breakfast  PARoxetine 10 milliGRAM(s) Oral daily  QUEtiapine 150 milliGRAM(s) Oral at bedtime    MEDICATIONS  (PRN):  ibuprofen  Tablet 400 milliGRAM(s) Oral three times a day PRN pain mild 1-3  QUEtiapine 25 milliGRAM(s) Oral three times a day PRN agitation  traMADol 50 milliGRAM(s) Oral three times a day PRN Moderate Pain (4 - 6)      REVIEW OF SYSTEMS:  CONSTITUTIONAL: No fever, weight loss, or fatigue  EYES: No eye pain, visual disturbances, or discharge  ENMT:  No difficulty hearing, tinnitus, vertigo; No sinus or throat pain  NECK: No pain or stiffness  RESPIRATORY: No cough, wheezing, chills or hemoptysis; No shortness of breath  CARDIOVASCULAR: No chest pain, palpitations, dizziness, or leg swelling  GASTROINTESTINAL: No abdominal or epigastric pain. No nausea, vomiting, or hematemesis; No diarrhea or constipation. No melena or hematochezia.  GENITOURINARY: No dysuria, frequency, hematuria, or incontinence  NEUROLOGICAL: No headaches, memory loss, loss of strength, numbness, or tremors  SKIN: No itching, burning, rashes, or lesions   LYMPH NODES: No enlarged glands  ENDOCRINE: No heat or cold intolerance; No hair loss  MUSCULOSKELETAL: No joint pain or swelling; No muscle, back, or extremity pain  PSYCHIATRIC: No depression, anxiety, mood swings, or difficulty sleeping  HEME/LYMPH: No easy bruising, or bleeding gums  ALLERY AND IMMUNOLOGIC: No hives or eczema    RADIOLOGY & ADDITIONAL TESTS:        PHYSICAL EXAM:  GENERAL: NAD, well-groomed, well-developed  HEAD:  Atraumatic, Normocephalic  EYES: EOMI, PERRLA, conjunctiva and sclera clear  ENMT: No tonsillar erythema, exudates, or enlargement; Moist mucous membranes, Good dentition, No lesions  NECK: Supple, No JVD, Normal thyroid  NERVOUS SYSTEM:  Alert & Oriented X3, Good concentration; Motor Strength 5/5 B/L upper and lower extremities; DTRs 2+ intact and symmetric  CHEST/LUNG: Clear to percussion bilaterally; No rales, rhonchi, wheezing, or rubs  HEART: Regular rate and rhythm; No murmurs, rubs, or gallops  ABDOMEN: Soft, Nontender, Nondistended; Bowel sounds present  EXTREMITIES:  2+ Peripheral Pulses, No clubbing, cyanosis, or edema  LYMPH: No lymphadenopathy noted  SKIN: No rashes or lesions    Care Discussed with Consultants/Other Providers [ ] YES  [ ] NO Patient is a 48y old  Female with h/o IVDA who presents with a chief complaint of Pt states she was admitted Pt at [facility] [place] for IV abx due to endocarditis, she left AMA due to their inability to obtain IV access and they refused to place PICC line. (04 May 2018 21:31). She has started on Nafcillin and The ID consult requested to assist with further evaluation and antibiotic management.      REVIEW OF SYSTEMS: Total of twelve systems have been reviewed with patient and found to be negative unless mentioned in HPI          PAST MEDICAL & SURGICAL HISTORY:  IV drug user  Seizure  Bipolar disorder, current episode mixed, severe, without psychotic features  Hep C w/o coma, chronic  No significant past surgical history          SOCIAL HISTORY  Alcohol: Does not drink  Tobacco: Does not smoke  Illicit substance use: None        FAMILY HISTORY: Non contributory to the present illness          ALLERGIES: NKDA        T(C): 36 (05-05-18 @ 13:40), Max: 36.6 (05-05-18 @ 05:24)  HR: 114 (05-05-18 @ 13:40) (97 - 114)  BP: 134/57 (05-05-18 @ 13:40) (108/57 - 167/70)  RR: 20 (05-05-18 @ 13:40) (20 - 20)  SpO2: --  Wt(kg): --  I&O's Summary        PHYSICAL EXAM:  GENERAL: Not in distress  CVS: s1 and s2 present  RESP: Air entry B/L  GI; abdomen soft and nontender  EXT: No pedal edema  CNS: AAOx3        LABS:                        11.1   9.32  )-----------( 337      ( 05 May 2018 06:47 )             33.4           05-05    139  |  102  |  18  ----------------------------<  103<H>  4.1   |  23  |  0.7    Ca    8.5      05 May 2018 06:47  Phos  3.9     05-05  Mg     1.8     05-05    TPro  6.3  /  Alb  3.2<L>  /  TBili  0.2  /  DBili  x   /  AST  65<H>  /  ALT  148<H>  /  AlkPhos  147<H>  05-05    PT/INR - ( 05 May 2018 06:47 )   PT: 10.30 sec;   INR: 0.95 ratio         PTT - ( 05 May 2018 06:47 )  PTT:24.8 sec          MEDICATIONS  (STANDING):  enoxaparin Injectable 40 milliGRAM(s) SubCutaneous every 24 hours  levETIRAcetam 500 milliGRAM(s) Oral two times a day  lidocaine   Patch 1 Patch Transdermal daily  nafcillin  IVPB 2 Gram(s) IV Intermittent once  nafcillin  IVPB 2 Gram(s) IV Intermittent every 4 hours  nafcillin  IVPB      nicotine -  14 mG/24Hr(s) Patch 1 patch Transdermal daily  pantoprazole    Tablet 40 milliGRAM(s) Oral before breakfast  PARoxetine 10 milliGRAM(s) Oral daily  QUEtiapine 150 milliGRAM(s) Oral at bedtime    MEDICATIONS  (PRN):  ibuprofen  Tablet 400 milliGRAM(s) Oral three times a day PRN pain mild 1-3  QUEtiapine 25 milliGRAM(s) Oral three times a day PRN agitation  traMADol 50 milliGRAM(s) Oral three times a day PRN Moderate Pain (4 - 6)          RADIOLOGY & ADDITIONAL TESTS:    < from: US Abdomen Limited (05.05.18 @ 08:55) >  Negative examination including negative examination of the liver..    < end of copied text >    < from: Xray Chest 2 Views PA/Lat (05.04.18 @ 20:22) >    Right middle lobe opacity, likely infectious or inflammatory. Follow-up   after treatment is recommended to demonstrate resolution.      < end of copied text >

## 2018-05-06 LAB
APPEARANCE UR: CLEAR — SIGNIFICANT CHANGE UP
BILIRUB UR-MCNC: NEGATIVE — SIGNIFICANT CHANGE UP
COLOR SPEC: YELLOW — SIGNIFICANT CHANGE UP
DIFF PNL FLD: NEGATIVE — SIGNIFICANT CHANGE UP
GLUCOSE UR QL: NEGATIVE MG/DL — SIGNIFICANT CHANGE UP
HAV IGG SER QL IA: REACTIVE
HAV IGM SER-ACNC: SIGNIFICANT CHANGE UP
HBV CORE AB SER-ACNC: SIGNIFICANT CHANGE UP
HBV CORE IGM SER-ACNC: SIGNIFICANT CHANGE UP
HBV SURFACE AB SER-ACNC: SIGNIFICANT CHANGE UP
HBV SURFACE AG SER-ACNC: SIGNIFICANT CHANGE UP
HCG UR QL: NEGATIVE — SIGNIFICANT CHANGE UP
HCV AB S/CO SERPL IA: 14.48 S/CO — SIGNIFICANT CHANGE UP
HCV AB SERPL-IMP: REACTIVE
KETONES UR-MCNC: NEGATIVE — SIGNIFICANT CHANGE UP
LEUKOCYTE ESTERASE UR-ACNC: NEGATIVE — SIGNIFICANT CHANGE UP
NITRITE UR-MCNC: NEGATIVE — SIGNIFICANT CHANGE UP
PH UR: 6 — SIGNIFICANT CHANGE UP (ref 5–8)
PROT UR-MCNC: NEGATIVE MG/DL — SIGNIFICANT CHANGE UP
SP GR SPEC: 1.01 — SIGNIFICANT CHANGE UP (ref 1.01–1.03)
UROBILINOGEN FLD QL: 1 MG/DL (ref 0.2–0.2)

## 2018-05-06 RX ADMIN — LIDOCAINE 1 PATCH: 4 CREAM TOPICAL at 11:34

## 2018-05-06 RX ADMIN — NAFCILLIN 200 GRAM(S): 10 INJECTION, POWDER, FOR SOLUTION INTRAVENOUS at 10:08

## 2018-05-06 RX ADMIN — Medication 10 MILLIGRAM(S): at 11:34

## 2018-05-06 RX ADMIN — Medication 1 PATCH: at 10:30

## 2018-05-06 RX ADMIN — TRAMADOL HYDROCHLORIDE 50 MILLIGRAM(S): 50 TABLET ORAL at 08:28

## 2018-05-06 RX ADMIN — QUETIAPINE FUMARATE 150 MILLIGRAM(S): 200 TABLET, FILM COATED ORAL at 21:40

## 2018-05-06 RX ADMIN — LEVETIRACETAM 500 MILLIGRAM(S): 250 TABLET, FILM COATED ORAL at 17:29

## 2018-05-06 RX ADMIN — NAFCILLIN 200 GRAM(S): 10 INJECTION, POWDER, FOR SOLUTION INTRAVENOUS at 02:24

## 2018-05-06 RX ADMIN — NAFCILLIN 200 GRAM(S): 10 INJECTION, POWDER, FOR SOLUTION INTRAVENOUS at 14:44

## 2018-05-06 RX ADMIN — NAFCILLIN 200 GRAM(S): 10 INJECTION, POWDER, FOR SOLUTION INTRAVENOUS at 05:30

## 2018-05-06 RX ADMIN — TRAMADOL HYDROCHLORIDE 50 MILLIGRAM(S): 50 TABLET ORAL at 16:45

## 2018-05-06 RX ADMIN — ENOXAPARIN SODIUM 40 MILLIGRAM(S): 100 INJECTION SUBCUTANEOUS at 05:29

## 2018-05-06 RX ADMIN — QUETIAPINE FUMARATE 25 MILLIGRAM(S): 200 TABLET, FILM COATED ORAL at 16:13

## 2018-05-06 RX ADMIN — TRAMADOL HYDROCHLORIDE 50 MILLIGRAM(S): 50 TABLET ORAL at 16:13

## 2018-05-06 RX ADMIN — TRAMADOL HYDROCHLORIDE 50 MILLIGRAM(S): 50 TABLET ORAL at 05:19

## 2018-05-06 RX ADMIN — NAFCILLIN 200 GRAM(S): 10 INJECTION, POWDER, FOR SOLUTION INTRAVENOUS at 17:29

## 2018-05-06 RX ADMIN — TRAMADOL HYDROCHLORIDE 50 MILLIGRAM(S): 50 TABLET ORAL at 08:03

## 2018-05-06 RX ADMIN — TRAMADOL HYDROCHLORIDE 50 MILLIGRAM(S): 50 TABLET ORAL at 05:18

## 2018-05-06 RX ADMIN — QUETIAPINE FUMARATE 25 MILLIGRAM(S): 200 TABLET, FILM COATED ORAL at 08:04

## 2018-05-06 RX ADMIN — PANTOPRAZOLE SODIUM 40 MILLIGRAM(S): 20 TABLET, DELAYED RELEASE ORAL at 08:00

## 2018-05-06 RX ADMIN — NAFCILLIN 200 GRAM(S): 10 INJECTION, POWDER, FOR SOLUTION INTRAVENOUS at 21:38

## 2018-05-06 RX ADMIN — LEVETIRACETAM 500 MILLIGRAM(S): 250 TABLET, FILM COATED ORAL at 05:30

## 2018-05-06 RX ADMIN — LIDOCAINE 1 PATCH: 4 CREAM TOPICAL at 21:56

## 2018-05-06 RX ADMIN — Medication 1 PATCH: at 11:34

## 2018-05-06 NOTE — PROGRESS NOTE ADULT - ASSESSMENT
A/P: 1. IE in IVDU user, confirmed with VIOLETA.   2. Bacteremia with MSSA  - repeat BCx x3 negative  - cont Nafcillin  - ID eval appreciated  3. IVDU - avoid IV opioids, utox  4. Newly diagnosed Hep C, treatment naive (as per pt) - GI eval as OP  - US abd/liver  - AFP  5. Septic emboli in lungs - no respiratory symptoms  6. Seizure disorder - cont home meds  7. Bipolar disorder - cont home meds    DVT ppx.      Pt will need PICC and STR placement given her Hx of IVDU.

## 2018-05-06 NOTE — PROGRESS NOTE ADULT - SUBJECTIVE AND OBJECTIVE BOX
RACHEL LOPEZ    48/F hx of IVDU and cocaine use,  Bipolar d/o, seizure d/o, Hep C, recent admission (discharged ) to [facility] found to have IE (TV) and left AMA b/c were delaying PICC line placement. Pt states right after she left the hospital she had another fever and decided to come to another hospital.   Currently no complaints, denies cough, no more fever, chills.     INTERVAL HPI/OVERNIGHT EVENTS: No new events, no new complaints    PHYSICAL EXAM:  T(C): 36.4, Max: 36.4 (18 @ 12:14)  HR: 104 (89 - 104)  BP: 104/64 (100/57 - 116/72)  RR: 20 (18 - 20)  SpO2: --    GENERAL: NAD, not toxic looking  HEENT: PERRL, no Li's spots  NECK: supple, no JVD  RESP: CTA b/l, no crackles, rhonchi  CVS: S1S2,RRR  GI: abdomen soft NT, ND  Extremities: no c/c/edema, no endocarditis stigmata  NEURO: AOx3, no focal deficit  H/L: no enlarged LN noted     LABS:                        11.1   9.32  )-----------( 337      ( 05 May 2018 06:47 )             33.4         139  |  102  |  18  ----------------------------<  103<H>  4.1   |  23  |  0.7    Ca    8.5      05 May 2018 06:47  Phos  3.9     -  Mg     1.8     -    TPro  6.3  /  Alb  3.2<L>  /  TBili  0.2  /  DBili  x   /  AST  65<H>  /  ALT  148<H>  /  AlkPhos  147<H>  05-    PT/INR - ( 05 May 2018 06:47 )   PT: 10.30 sec;   INR: 0.95 ratio         PTT - ( 05 May 2018 06:47 )  PTT:24.8 sec  Urinalysis Basic - ( 06 May 2018 02:46 )    Color: Yellow / Appearance: Clear / S.015 / pH: x  Gluc: x / Ketone: Negative  / Bili: Negative / Urobili: 1.0 mg/dL   Blood: x / Protein: Negative mg/dL / Nitrite: Negative   Leuk Esterase: Negative / RBC: x / WBC x   Sq Epi: x / Non Sq Epi: x / Bacteria: x    Culture - Blood (collected 05 May 2018 06:47)  Source: .Blood None  Preliminary Report (06 May 2018 11:00):    No growth to date.    Culture - Blood (collected 04 May 2018 19:56)  Source: .Blood Blood  Preliminary Report (06 May 2018 02:03):    No growth to date.    Culture - Blood (collected 04 May 2018 19:56)  Source: .Blood Blood  Preliminary Report (06 May 2018 02:03):    No growth to date.    Urinalysis Basic - ( 06 May 2018 02:46 )    Color: Yellow / Appearance: Clear / S.015 / pH: x  Gluc: x / Ketone: Negative  / Bili: Negative / Urobili: 1.0 mg/dL   Blood: x / Protein: Negative mg/dL / Nitrite: Negative   Leuk Esterase: Negative / RBC: x / WBC x   Sq Epi: x / Non Sq Epi: x / Bacteria: x      MEDICATIONS  (STANDING):  enoxaparin Injectable 40 milliGRAM(s) SubCutaneous every 24 hours  levETIRAcetam 500 milliGRAM(s) Oral two times a day  lidocaine   Patch 1 Patch Transdermal daily  nafcillin  IVPB 2 Gram(s) IV Intermittent every 4 hours  nafcillin  IVPB      nicotine -  14 mG/24Hr(s) Patch 1 patch Transdermal daily  pantoprazole    Tablet 40 milliGRAM(s) Oral before breakfast  PARoxetine 10 milliGRAM(s) Oral daily  QUEtiapine 150 milliGRAM(s) Oral at bedtime    MEDICATIONS  (PRN):  ibuprofen  Tablet 400 milliGRAM(s) Oral three times a day PRN pain mild 1-3  QUEtiapine 25 milliGRAM(s) Oral three times a day PRN agitation  traMADol 50 milliGRAM(s) Oral three times a day PRN Moderate Pain (4 - 6)

## 2018-05-07 LAB
AMPHET UR-MCNC: NEGATIVE — SIGNIFICANT CHANGE UP
BARBITURATES UR SCN-MCNC: POSITIVE
BENZODIAZ UR-MCNC: NEGATIVE — SIGNIFICANT CHANGE UP
COCAINE METAB.OTHER UR-MCNC: POSITIVE
DRUG SCREEN 1, URINE RESULT: SIGNIFICANT CHANGE UP
HBV E AB SER-ACNC: NEGATIVE — SIGNIFICANT CHANGE UP
HBV E AG SER-ACNC: NEGATIVE — SIGNIFICANT CHANGE UP
HIV 1+2 AB+HIV1 P24 AG SERPL QL IA: SIGNIFICANT CHANGE UP
METHADONE UR-MCNC: NEGATIVE — SIGNIFICANT CHANGE UP
OPIATES UR-MCNC: NEGATIVE — SIGNIFICANT CHANGE UP
PCP UR-MCNC: NEGATIVE — SIGNIFICANT CHANGE UP
PROPOXYPHENE QUALITATIVE URINE RESULT: NEGATIVE — SIGNIFICANT CHANGE UP
THC UR QL: NEGATIVE — SIGNIFICANT CHANGE UP

## 2018-05-07 RX ADMIN — QUETIAPINE FUMARATE 25 MILLIGRAM(S): 200 TABLET, FILM COATED ORAL at 16:25

## 2018-05-07 RX ADMIN — TRAMADOL HYDROCHLORIDE 50 MILLIGRAM(S): 50 TABLET ORAL at 00:15

## 2018-05-07 RX ADMIN — LEVETIRACETAM 500 MILLIGRAM(S): 250 TABLET, FILM COATED ORAL at 06:05

## 2018-05-07 RX ADMIN — Medication 10 MILLIGRAM(S): at 12:02

## 2018-05-07 RX ADMIN — QUETIAPINE FUMARATE 25 MILLIGRAM(S): 200 TABLET, FILM COATED ORAL at 06:13

## 2018-05-07 RX ADMIN — LIDOCAINE 1 PATCH: 4 CREAM TOPICAL at 12:02

## 2018-05-07 RX ADMIN — LEVETIRACETAM 500 MILLIGRAM(S): 250 TABLET, FILM COATED ORAL at 17:08

## 2018-05-07 RX ADMIN — NAFCILLIN 200 GRAM(S): 10 INJECTION, POWDER, FOR SOLUTION INTRAVENOUS at 13:03

## 2018-05-07 RX ADMIN — Medication 1 PATCH: at 10:42

## 2018-05-07 RX ADMIN — NAFCILLIN 200 GRAM(S): 10 INJECTION, POWDER, FOR SOLUTION INTRAVENOUS at 14:44

## 2018-05-07 RX ADMIN — NAFCILLIN 200 GRAM(S): 10 INJECTION, POWDER, FOR SOLUTION INTRAVENOUS at 22:04

## 2018-05-07 RX ADMIN — TRAMADOL HYDROCHLORIDE 50 MILLIGRAM(S): 50 TABLET ORAL at 00:45

## 2018-05-07 RX ADMIN — TRAMADOL HYDROCHLORIDE 50 MILLIGRAM(S): 50 TABLET ORAL at 16:25

## 2018-05-07 RX ADMIN — TRAMADOL HYDROCHLORIDE 50 MILLIGRAM(S): 50 TABLET ORAL at 17:36

## 2018-05-07 RX ADMIN — QUETIAPINE FUMARATE 150 MILLIGRAM(S): 200 TABLET, FILM COATED ORAL at 22:05

## 2018-05-07 RX ADMIN — NAFCILLIN 200 GRAM(S): 10 INJECTION, POWDER, FOR SOLUTION INTRAVENOUS at 17:08

## 2018-05-07 RX ADMIN — NAFCILLIN 200 GRAM(S): 10 INJECTION, POWDER, FOR SOLUTION INTRAVENOUS at 01:45

## 2018-05-07 RX ADMIN — PANTOPRAZOLE SODIUM 40 MILLIGRAM(S): 20 TABLET, DELAYED RELEASE ORAL at 06:05

## 2018-05-07 RX ADMIN — NAFCILLIN 200 GRAM(S): 10 INJECTION, POWDER, FOR SOLUTION INTRAVENOUS at 06:05

## 2018-05-07 RX ADMIN — ENOXAPARIN SODIUM 40 MILLIGRAM(S): 100 INJECTION SUBCUTANEOUS at 06:05

## 2018-05-07 NOTE — PROGRESS NOTE ADULT - SUBJECTIVE AND OBJECTIVE BOX
SUBJECTIVE:    Patient is a 48y old  Female who presents with a chief complaint of Pt states she was admitted Pt at [facility] for IV abx due to endocarditis, she left AMA due to their inability to obtain IV access and they refused to place PICC line. (04 May 2018 21:31)    Currently admitted to medicine with the primary diagnosis of Endocarditis     Today is hospital day 3d. This morning she is resting comfortably in bed and reports no new issues or overnight events.     PAST MEDICAL & SURGICAL HISTORY  PAST MEDICAL & SURGICAL HISTORY:  IV drug user  Seizure  Bipolar disorder, current episode mixed, severe, without psychotic features  Hep C w/o coma, chronic  No significant past surgical history    SOCIAL HISTORY:    ALLERGIES:  No Known Allergies    MEDICATIONS:  STANDING MEDICATIONS  enoxaparin Injectable 40 milliGRAM(s) SubCutaneous every 24 hours  levETIRAcetam 500 milliGRAM(s) Oral two times a day  lidocaine   Patch 1 Patch Transdermal daily  nafcillin  IVPB 2 Gram(s) IV Intermittent every 4 hours  nafcillin  IVPB      nicotine -  14 mG/24Hr(s) Patch 1 patch Transdermal daily  pantoprazole    Tablet 40 milliGRAM(s) Oral before breakfast  PARoxetine 10 milliGRAM(s) Oral daily  QUEtiapine 150 milliGRAM(s) Oral at bedtime    PRN MEDICATIONS  ibuprofen  Tablet 400 milliGRAM(s) Oral three times a day PRN  QUEtiapine 25 milliGRAM(s) Oral three times a day PRN  traMADol 50 milliGRAM(s) Oral three times a day PRN    VITALS:   T(F): 97.8  HR: 87  BP: 106/51  RR: 20  SpO2: --    HOME MEDS: </u  Keppra 500 mg oral tablet: 1 tab(s) orally 2 times a day  Paxil 10 mg oral tablet: 1 tab(s) orally once a day  SEROquel 25 mg oral tablet: 1 tab(s) orally 3 times a day, As Needed  SEROquel  mg oral tablet, extended release: 1 tab(s) orally once a day (in the evening)      LABS:            Urinalysis Basic - ( 06 May 2018 02:46 )    Color: Yellow / Appearance: Clear / S.015 / pH: x  Gluc: x / Ketone: Negative  / Bili: Negative / Urobili: 1.0 mg/dL   Blood: x / Protein: Negative mg/dL / Nitrite: Negative   Leuk Esterase: Negative / RBC: x / WBC x   Sq Epi: x / Non Sq Epi: x / Bacteria: x    Culture - Blood (collected 05 May 2018 06:47)  Source: .Blood None  Preliminary Report (06 May 2018 11:00):    No growth to date.    Culture - Blood (collected 04 May 2018 19:56)  Source: .Blood Blood  Preliminary Report (06 May 2018 02:03):    No growth to date.    Culture - Blood (collected 04 May 2018 19:56)  Source: .Blood Blood  Preliminary Report (06 May 2018 02:03):    No growth to date.      RADIOLOGY:  < from: US Abdomen Limited (18 @ 08:55) >  Negative examination including negative examination of the liver..    < end of copied text >  < from: Xray Chest 2 Views PA/Lat (18 @ 20:22) >  Right middle lobe opacity, likely infectious or inflammatory. Follow-up   after treatment is recommended to demonstrate resolution.    < end of copied text >      PHYSICAL EXAM:  GEN: No acute distress  HEENT: WNL  LUNGS: Clear to auscultation bilaterally   HEART: S1/S2 present. RRR.   ABD: Soft, non-tender, non-distended. Bowel sounds present  EXT: no LE edema  NEURO: AAOX3    ASSESSMENT/PLAN:  # MSSA bacteremia and IE in IVDU user, confirmed with VIOLETA   - repeat BCx x3 negative  - ID:  Continue IV Nafcillin4. Need total of 6 weeks of IV antibiotic (-)    # IVDU - avoid IV or oral opioids, utox    # Newly diagnosed Hep C at [facility], treatment naive (as per pt)  - GI eval as OP  - US abd/liver unremarkable  - AFP    # Septic emboli in lungs - no respiratory symptoms  # RML opacity- pt afebrile, asymptomatic, if clinically worsens start Abx  # Seizure disorder - cont home meds  # Bipolar disorder - cont home meds    DVT ppx- lovenox  Full Code  Pt will need PICC if STR will accept patient, then STR placement given her Hx of IVDU

## 2018-05-07 NOTE — PROGRESS NOTE ADULT - SUBJECTIVE AND OBJECTIVE BOX
RACHEL LOPEZ      48/F hx of IVDU and cocaine use,  Bipolar d/o, seizure d/o, Hep C, recent admission (discharged ) to [facility] found to have IE (TV) and left AMA b/c were delaying PICC line placement. Pt states right after she left the hospital she had another fever and decided to come to another hospital.   Currently no complaints, denies cough, no more fever, chills.     INTERVAL HPI/OVERNIGHT EVENTS: No new events, no new complaints    PHYSICAL EXAM:  T(C): 36.6, Max: 36.6 (18 @ 21:15)  HR: 89 (87 - 117)  BP: 120/74 (106/51 - 132/75)  RR: 18 (18 - 20)    GENERAL: NAD, not toxic looking  HEENT: PERRL, no Li's spots  NECK: supple, no JVD  RESP: CTA b/l, no crackles, rhonchi  CVS: S1S2,RRR  GI: abdomen soft NT, ND  Extremities: no c/c/edema, no endocarditis stigmata  NEURO: AOx3, no focal deficit  H/L: no enlarged LN noted     Urinalysis Basic - ( 06 May 2018 02:46 )    Color: Yellow / Appearance: Clear / S.015 / pH: x  Gluc: x / Ketone: Negative  / Bili: Negative / Urobili: 1.0 mg/dL   Blood: x / Protein: Negative mg/dL / Nitrite: Negative   Leuk Esterase: Negative / RBC: x / WBC x   Sq Epi: x / Non Sq Epi: x / Bacteria: x    Culture - Blood (collected 05 May 2018 06:47)  Source: .Blood None  Preliminary Report (06 May 2018 11:00):    No growth to date.    Culture - Blood (collected 04 May 2018 19:56)  Source: .Blood Blood  Preliminary Report (06 May 2018 02:03):    No growth to date.    Culture - Blood (collected 04 May 2018 19:56)  Source: .Blood Blood  Preliminary Report (06 May 2018 02:03):    No growth to date.    Urinalysis Basic - ( 06 May 2018 02:46 )    Color: Yellow / Appearance: Clear / S.015 / pH: x  Gluc: x / Ketone: Negative  / Bili: Negative / Urobili: 1.0 mg/dL   Blood: x / Protein: Negative mg/dL / Nitrite: Negative   Leuk Esterase: Negative / RBC: x / WBC x   Sq Epi: x / Non Sq Epi: x / Bacteria: x      MEDICATIONS  (STANDING):  enoxaparin Injectable 40 milliGRAM(s) SubCutaneous every 24 hours  levETIRAcetam 500 milliGRAM(s) Oral two times a day  lidocaine   Patch 1 Patch Transdermal daily  nafcillin  IVPB 2 Gram(s) IV Intermittent every 4 hours  nafcillin  IVPB      nicotine -  14 mG/24Hr(s) Patch 1 patch Transdermal daily  pantoprazole    Tablet 40 milliGRAM(s) Oral before breakfast  PARoxetine 10 milliGRAM(s) Oral daily  QUEtiapine 150 milliGRAM(s) Oral at bedtime    MEDICATIONS  (PRN):  ibuprofen  Tablet 400 milliGRAM(s) Oral three times a day PRN pain mild 1-3  QUEtiapine 25 milliGRAM(s) Oral three times a day PRN agitation  traMADol 50 milliGRAM(s) Oral three times a day PRN Moderate Pain (4 - 6)

## 2018-05-07 NOTE — PROGRESS NOTE ADULT - ASSESSMENT
A/P: 1. IE in IVDU user, confirmed with VIOLETA.   2. Bacteremia with MSSA  - repeat BCx x3 negative  - cont Nafcillin to complete 6 weeks course  - PICC line  - ID eval appreciated  3. IVDU - avoid IV opioids, utox  4. Newly diagnosed Hep C, treatment naive (as per pt) - GI eval as OP  - US abd/liver  - AFP  5. Septic emboli in lungs - no respiratory symptoms  6. Seizure disorder - cont home meds  7. Bipolar disorder - cont home meds    DVT ppx.      Pt will need PICC and STR placement given her Hx of IVDU.

## 2018-05-08 VITALS
SYSTOLIC BLOOD PRESSURE: 144 MMHG | DIASTOLIC BLOOD PRESSURE: 76 MMHG | HEART RATE: 141 BPM | RESPIRATION RATE: 20 BRPM | TEMPERATURE: 98 F

## 2018-05-08 LAB
ALBUMIN SERPL ELPH-MCNC: 3.5 G/DL — SIGNIFICANT CHANGE UP (ref 3.5–5.2)
ALP SERPL-CCNC: 137 U/L — HIGH (ref 30–115)
ALT FLD-CCNC: 84 U/L — HIGH (ref 0–41)
ANION GAP SERPL CALC-SCNC: 17 MMOL/L — HIGH (ref 7–14)
AST SERPL-CCNC: 43 U/L — HIGH (ref 0–41)
BASOPHILS # BLD AUTO: 0.06 K/UL — SIGNIFICANT CHANGE UP (ref 0–0.2)
BASOPHILS NFR BLD AUTO: 0.7 % — SIGNIFICANT CHANGE UP (ref 0–1)
BILIRUB DIRECT SERPL-MCNC: <0.2 MG/DL — SIGNIFICANT CHANGE UP (ref 0–0.2)
BILIRUB INDIRECT FLD-MCNC: SIGNIFICANT CHANGE UP MG/DL (ref 0.2–1.2)
BILIRUB SERPL-MCNC: <0.2 MG/DL — SIGNIFICANT CHANGE UP (ref 0.2–1.2)
BUN SERPL-MCNC: 16 MG/DL — SIGNIFICANT CHANGE UP (ref 10–20)
CALCIUM SERPL-MCNC: 8.8 MG/DL — SIGNIFICANT CHANGE UP (ref 8.5–10.1)
CHLORIDE SERPL-SCNC: 101 MMOL/L — SIGNIFICANT CHANGE UP (ref 98–110)
CO2 SERPL-SCNC: 23 MMOL/L — SIGNIFICANT CHANGE UP (ref 17–32)
CREAT SERPL-MCNC: 0.7 MG/DL — SIGNIFICANT CHANGE UP (ref 0.7–1.5)
EOSINOPHIL # BLD AUTO: 0.35 K/UL — SIGNIFICANT CHANGE UP (ref 0–0.7)
EOSINOPHIL NFR BLD AUTO: 4.1 % — SIGNIFICANT CHANGE UP (ref 0–8)
GLUCOSE SERPL-MCNC: 121 MG/DL — HIGH (ref 70–99)
HCT VFR BLD CALC: 36.3 % — LOW (ref 37–47)
HGB BLD-MCNC: 11.9 G/DL — LOW (ref 12–16)
IMM GRANULOCYTES NFR BLD AUTO: 0.6 % — HIGH (ref 0.1–0.3)
LYMPHOCYTES # BLD AUTO: 1.83 K/UL — SIGNIFICANT CHANGE UP (ref 1.2–3.4)
LYMPHOCYTES # BLD AUTO: 21.7 % — SIGNIFICANT CHANGE UP (ref 20.5–51.1)
MAGNESIUM SERPL-MCNC: 1.6 MG/DL — LOW (ref 1.8–2.4)
MCHC RBC-ENTMCNC: 29.5 PG — SIGNIFICANT CHANGE UP (ref 27–31)
MCHC RBC-ENTMCNC: 32.8 G/DL — SIGNIFICANT CHANGE UP (ref 32–37)
MCV RBC AUTO: 89.9 FL — SIGNIFICANT CHANGE UP (ref 81–99)
MONOCYTES # BLD AUTO: 0.59 K/UL — SIGNIFICANT CHANGE UP (ref 0.1–0.6)
MONOCYTES NFR BLD AUTO: 7 % — SIGNIFICANT CHANGE UP (ref 1.7–9.3)
NEUTROPHILS # BLD AUTO: 5.57 K/UL — SIGNIFICANT CHANGE UP (ref 1.4–6.5)
NEUTROPHILS NFR BLD AUTO: 65.9 % — SIGNIFICANT CHANGE UP (ref 42.2–75.2)
NRBC # BLD: 0 /100 WBCS — SIGNIFICANT CHANGE UP (ref 0–0)
PLATELET # BLD AUTO: 310 K/UL — SIGNIFICANT CHANGE UP (ref 130–400)
POTASSIUM SERPL-MCNC: 3.8 MMOL/L — SIGNIFICANT CHANGE UP (ref 3.5–5)
POTASSIUM SERPL-SCNC: 3.8 MMOL/L — SIGNIFICANT CHANGE UP (ref 3.5–5)
PROT SERPL-MCNC: 6.7 G/DL — SIGNIFICANT CHANGE UP (ref 6–8)
RBC # BLD: 4.04 M/UL — LOW (ref 4.2–5.4)
RBC # FLD: 13.8 % — SIGNIFICANT CHANGE UP (ref 11.5–14.5)
SODIUM SERPL-SCNC: 141 MMOL/L — SIGNIFICANT CHANGE UP (ref 135–146)
WBC # BLD: 8.45 K/UL — SIGNIFICANT CHANGE UP (ref 4.8–10.8)
WBC # FLD AUTO: 8.45 K/UL — SIGNIFICANT CHANGE UP (ref 4.8–10.8)

## 2018-05-08 RX ORDER — LEVETIRACETAM 250 MG/1
1 TABLET, FILM COATED ORAL
Qty: 20 | Refills: 0 | OUTPATIENT
Start: 2018-05-08 | End: 2018-05-17

## 2018-05-08 RX ORDER — QUETIAPINE FUMARATE 200 MG/1
1 TABLET, FILM COATED ORAL
Qty: 30 | Refills: 0 | OUTPATIENT
Start: 2018-05-08 | End: 2018-05-17

## 2018-05-08 RX ORDER — LEVETIRACETAM 250 MG/1
1 TABLET, FILM COATED ORAL
Qty: 0 | Refills: 0 | COMMUNITY

## 2018-05-08 RX ORDER — QUETIAPINE FUMARATE 200 MG/1
1 TABLET, FILM COATED ORAL
Qty: 10 | Refills: 0 | OUTPATIENT
Start: 2018-05-08 | End: 2018-05-17

## 2018-05-08 RX ORDER — QUETIAPINE FUMARATE 200 MG/1
1 TABLET, FILM COATED ORAL
Qty: 0 | Refills: 0 | COMMUNITY

## 2018-05-08 RX ORDER — DICLOXACILLIN SODIUM 500 MG/1
1 CAPSULE ORAL
Qty: 40 | Refills: 0 | OUTPATIENT
Start: 2018-05-08 | End: 2018-05-17

## 2018-05-08 RX ADMIN — Medication 10 MILLIGRAM(S): at 12:22

## 2018-05-08 RX ADMIN — PANTOPRAZOLE SODIUM 40 MILLIGRAM(S): 20 TABLET, DELAYED RELEASE ORAL at 10:50

## 2018-05-08 RX ADMIN — QUETIAPINE FUMARATE 25 MILLIGRAM(S): 200 TABLET, FILM COATED ORAL at 10:50

## 2018-05-08 RX ADMIN — TRAMADOL HYDROCHLORIDE 50 MILLIGRAM(S): 50 TABLET ORAL at 10:50

## 2018-05-08 RX ADMIN — LIDOCAINE 1 PATCH: 4 CREAM TOPICAL at 12:20

## 2018-05-08 RX ADMIN — NAFCILLIN 200 GRAM(S): 10 INJECTION, POWDER, FOR SOLUTION INTRAVENOUS at 02:32

## 2018-05-08 RX ADMIN — LIDOCAINE 1 PATCH: 4 CREAM TOPICAL at 00:32

## 2018-05-08 RX ADMIN — ENOXAPARIN SODIUM 40 MILLIGRAM(S): 100 INJECTION SUBCUTANEOUS at 05:32

## 2018-05-08 RX ADMIN — LEVETIRACETAM 500 MILLIGRAM(S): 250 TABLET, FILM COATED ORAL at 05:31

## 2018-05-08 NOTE — DISCHARGE NOTE ADULT - PROVIDER TOKENS
TOKEN:'[id number]',TOKEN:'[id number]:'[id number]',FREE:[LAST:[Cardiothoracic Surgeon at [facility]],PHONE:[(   )    -],FAX:[(   )    -],ADDRESS:[Patient reports she has card with name and contact info at home, will follow up with CTS there.]]

## 2018-05-08 NOTE — PROGRESS NOTE ADULT - SUBJECTIVE AND OBJECTIVE BOX
RACHEL LOPEZ    48/F hx of IVDU and cocaine use,  Bipolar d/o, seizure d/o, Hep C, recent admission (discharged 05/03) to [facility] found to have IE (TV) and left AMA b/c were delaying PICC line placement. Pt states right after she left the hospital she had another fever and decided to come to another hospital.   Currently no complaints, denies cough, no more fever, chills.     INTERVAL HPI/OVERNIGHT EVENTS: no new complaints.    PHYSICAL EXAM:  T(C): 36.8, Max: 36.8 (05-08-18 @ 12:44)  HR: 141 (92 - 141)  BP: 144/76 (116/61 - 144/76)  RR: 20 (18 - 20)  SpO2: 99% (99% - 99%)    GENERAL: NAD, not toxic looking  HEENT: PERRL, no Li's spots  NECK: supple, no JVD  RESP: CTA b/l, no crackles, rhonchi  CVS: S1S2,RRR  GI: abdomen soft NT, ND  Extremities: no c/c/edema, no endocarditis stigmata  NEURO: AOx3, no focal deficit  H/L: no enlarged LN noted     LABS:                        11.9   8.45  )-----------( 310      ( 08 May 2018 06:53 )             36.3     05-08    141  |  101  |  16  ----------------------------<  121<H>  3.8   |  23  |  0.7    Ca    8.8      08 May 2018 06:53  Mg     1.6     05-08    TPro  6.7  /  Alb  3.5  /  TBili  <0.2  /  DBili  <0.2  /  AST  43<H>  /  ALT  84<H>  /  AlkPhos  137<H>  05-08    MEDICATIONS  (STANDING):  enoxaparin Injectable 40 milliGRAM(s) SubCutaneous every 24 hours  levETIRAcetam 500 milliGRAM(s) Oral two times a day  lidocaine   Patch 1 Patch Transdermal daily  nafcillin  IVPB 2 Gram(s) IV Intermittent every 4 hours  nafcillin  IVPB      nicotine -  14 mG/24Hr(s) Patch 1 patch Transdermal daily  pantoprazole    Tablet 40 milliGRAM(s) Oral before breakfast  PARoxetine 10 milliGRAM(s) Oral daily  QUEtiapine 150 milliGRAM(s) Oral at bedtime    MEDICATIONS  (PRN):  ibuprofen  Tablet 400 milliGRAM(s) Oral three times a day PRN pain mild 1-3  QUEtiapine 25 milliGRAM(s) Oral three times a day PRN agitation  traMADol 50 milliGRAM(s) Oral three times a day PRN Moderate Pain (4 - 6)

## 2018-05-08 NOTE — DISCHARGE NOTE ADULT - PATIENT PORTAL LINK FT
You can access the [facility] Patient Portal, offered by [employer], by registering with the following website: [email or web address]

## 2018-05-08 NOTE — DISCHARGE NOTE ADULT - SECONDARY DIAGNOSIS.
Hep C w/o coma, chronic IV drug user Seizure Septic pulmonary embolism without acute cor pulmonale Opacity of lung on imaging study

## 2018-05-08 NOTE — PROGRESS NOTE ADULT - SUBJECTIVE AND OBJECTIVE BOX
SUBJECTIVE:    Patient is a 48y old  Female who presents with a chief complaint of Pt states she was admitted Pt at [facility] for IV abx due to endocarditis, she left AMA due to their inability to obtain IV access and they refused to place PICC line. (04 May 2018 21:31)    Currently admitted to medicine with the primary diagnosis of Endocarditis     Today is hospital day 4d. This morning she is resting comfortably in bed and reports no new issues or overnight events.     PAST MEDICAL & SURGICAL HISTORY  PAST MEDICAL & SURGICAL HISTORY:  IV drug user  Seizure  Bipolar disorder, current episode mixed, severe, without psychotic features  Hep C w/o coma, chronic  No significant past surgical history    SOCIAL HISTORY:    ALLERGIES:  No Known Allergies    MEDICATIONS:  STANDING MEDICATIONS  enoxaparin Injectable 40 milliGRAM(s) SubCutaneous every 24 hours  levETIRAcetam 500 milliGRAM(s) Oral two times a day  lidocaine   Patch 1 Patch Transdermal daily  nafcillin  IVPB 2 Gram(s) IV Intermittent every 4 hours  nafcillin  IVPB      nicotine -  14 mG/24Hr(s) Patch 1 patch Transdermal daily  pantoprazole    Tablet 40 milliGRAM(s) Oral before breakfast  PARoxetine 10 milliGRAM(s) Oral daily  QUEtiapine 150 milliGRAM(s) Oral at bedtime    PRN MEDICATIONS  ibuprofen  Tablet 400 milliGRAM(s) Oral three times a day PRN  QUEtiapine 25 milliGRAM(s) Oral three times a day PRN  traMADol 50 milliGRAM(s) Oral three times a day PRN    VITALS:   T(F): 96.6  HR: 95  BP: 116/61  RR: 18  SpO2: 99%    HOME MEDS: </u  Keppra 500 mg oral tablet: 1 tab(s) orally 2 times a day  Paxil 10 mg oral tablet: 1 tab(s) orally once a day  SEROquel 25 mg oral tablet: 1 tab(s) orally 3 times a day, As Needed  SEROquel  mg oral tablet, extended release: 1 tab(s) orally once a day (in the evening)      LABS:                        11.9   8.45  )-----------( 310      ( 08 May 2018 06:53 )             36.3       RADIOLOGY:    PHYSICAL EXAM:  GEN: No acute distress  HEENT: WNL  LUNGS: Clear to auscultation bilaterally   HEART: S1/S2 present. RRR.   ABD: Soft, non-tender, non-distended. Bowel sounds present  EXT: no LE edema  NEURO: AAOX3    ASSESSMENT/PLAN:  # MSSA bacteremia and IE in IVDU user, confirmed with VIOLETA   - repeat BCx x3 negative  - ID:  Continue IV Nafcillin4. Need total of 6 weeks of IV antibiotic (4/9-5/21). Will change to Ancef 2 gm iv q8h on discharge. If not getting PICC (advise against given substance abuse if not going to be supervised at NH) can go home on Dicloxacillin 500mg PO Q6H and Probenecid 500mg PO Q12H  - medications escripted to [place] to investigate costs as patient has financial strain    # IVDU - avoid IV or oral opioids, utox    # Newly diagnosed Hep C at [facility], treatment naive (as per pt)  - GI eval as OP  - US abd/liver unremarkable  - AFP    # Septic emboli in lungs - no respiratory symptoms  # RML opacity- pt afebrile, asymptomatic, if clinically worsens start Abx  # Seizure disorder - cont home meds  # Bipolar disorder - cont home meds    DVT ppx- lovenox  Full Code  Pt will either need PICC if STR will accept patient, then STR placement given her Hx of IVDU or discharge on PO antibiotics

## 2018-05-08 NOTE — DISCHARGE NOTE ADULT - HOSPITAL COURSE
48/F hx of IVDU (heroine last 12/2017) and cocaine use (last 3 days PTP),  Bipolar d/o, seizure d/o, Hep C, recent admission to [facility] found to have IE (TV) and left AMA b/c were delaying PICC line placement. In the beginning of 4/2018 - pt had a spider bite to right foot which turned into an infection at the site (now resolved), then had fevers and chest pain and N/V. Went to [facility] for evaluation, had VIOLETA done and was diagnosed with MSSA bacteremia complicated by TV IE and septic emboli to the lungs. She was started on Oxacillin, though left AMA bc PICC line was being "delayed" for no reason.Pt [facility]. She was advised upon leaving [facility] to seek medical care, and this is why she came to the ED at [facility].    # MSSA bacteremia and IE in IVDU user, confirmed with VIOLETA   - repeat BCx x3 negative  - ID:  Continue IV Nafcillin4. Need total of 6 weeks of IV antibiotic (4/9-5/21). If not getting PICC (advise against given substance abuse if not going to be supervised at NH) can go home on Dicloxacillin 500mg PO Q6H and Probenecid 500mg PO Q12H  - medications escripted to [facility] to investigate costs as patient has financial strain    # IVDU - avoid IV or oral opioids, utox    # Newly diagnosed Hep C at [facility] [place], treatment naive (as per pt)  - GI eval as OP  - US abd/liver unremarkable  - AFP    # Septic emboli in lungs - no respiratory symptoms  # RML opacity- pt afebrile, asymptomatic, if clinically worsens start Abx  # Seizure disorder - cont home meds  # Bipolar disorder - cont home meds    DVT ppx- lovenox  Full Code  Pt will either need PICC if STR will accept patient, then STR placement given her Hx of IVDU or discharge on PO antibiotics

## 2018-05-08 NOTE — PROGRESS NOTE ADULT - ASSESSMENT
A/P: 1. IE in IVDU user, confirmed with VIOLETA.   2. Bacteremia with MSSA  - repeat BCx x3 negative  - has been on Nafcillin IV since 4/9/2018, completed 4 weeks of IV ABx, blood Cx negative, pt can be discharged home on PO ABx, Pt was seen by ID who recommended Dicloxacillin and Probenecid. Cost clarified with the pharmacy and pt states it is affordable for her.   3. IVDU - avoid IV opioids  4. Newly diagnosed Hep C, treatment naive (as per pt) - GI eval as OP for possible treatment  - US abd/liver done - no hepatic mass, no ascites  5. Septic emboli in lungs - no respiratory symptoms  6. Seizure disorder - cont home meds  7. Bipolar disorder - cont home meds    Pt is clinically stable for discharge home with PO ABx with OP f/u with PMD, CTSx, GI/hepatologist.  Pt agreed with the plan.

## 2018-05-08 NOTE — PROGRESS NOTE ADULT - ASSESSMENT
Patient is a 48y old  Female with h/o IVDA who presents with a chief complaint of Pt states she was admitted Pt at [facility] [place] for IV abx due to endocarditis, she left AMA due to their inability to obtain IV access and they refused to place PICC line. (04 May 2018 21:31). She has started on Nafcillin and The ID consult requested to assist with further evaluation and antibiotic management.    # IVDA  # MSSA bacteremia  # IE    Recommend:   Obtain Medical records from [facility]   Continue IV Nafcillin  Will change to Ancef 2 gm iv q8h on discharge   Need total of 6 weeks of IV antibiotic Patient is a 48y old  Female with h/o IVDA who presents with a chief complaint of Pt states she was admitted Pt at [facility] [place] for IV abx due to endocarditis, she left AMA due to their inability to obtain IV access and they refused to place PICC line. (04 May 2018 21:31). She has started on Nafcillin and The ID consult requested to assist with further evaluation and antibiotic management.    # IVDA  # MSSA bacteremia  # IE    Recommend:   Obtain Medical records from [facility]   Continue IV Nafcillin  Will change to Ancef 2 gm iv q8h on discharge   Need total of 6 weeks of IV antibiotic    ADDENDUM:  Pt has had 4 weeks of iv antibiotics from negative blood cultures.  Could change iv to po Dicloxacillin 500mg q6h and Probenecid 500mg q12h for 10 more days

## 2018-05-08 NOTE — DISCHARGE NOTE ADULT - CARE PLAN
Principal Discharge DX:	Acute bacterial endocarditis  Goal:	Clinical Followup  Assessment and plan of treatment:	You were recently admitted to [facility] for evaluation of fevers, had VIOLETA done and was diagnosed with MSSA bacteremia complicated by TV IE and septic emboli to the lungs. You were started on Oxacillin, though left AMA because as per patient PICC line was being delayed for no reason. Pt was also found to have Hep C while at [facility]. You were continued on Nafcillin 2g IV Q4H while inpatient at [facility]. Infectious disease specialist recommended discharge with Dicloxacillin 500mg Q6H and Probenecid 500mg Q12H  until 5/22/18.  Total duration of antibiotics for 6 weeks (4/9/18-5/22/18). It was recommended not to place PICC line at this time, as [facility] that case management reached out to were not accepting patient with substance abuse history and PICC line, and patient already has received 4 weeks of IV antibiotics. Please follow up as an outpatient with Dr. Joiner from Infectious Disease. As an inpatient, blood cultures have been negative for 3 days. Please also follow up with Cardiology, Dr. Presley and Cardiothoracic Surgeon (CTS). You reported that you would follow up with CTS at [facility], and that CTS will not perform surgery until Hepatitis C is treated.  Secondary Diagnosis:	Hep C w/o coma, chronic  Goal:	Clinical Followup  Assessment and plan of treatment:	Please follow up with Gastroenterology as an outpatient, Ultrasound of abdomen and liver was unremarkable.  Secondary Diagnosis:	IV drug user  Goal:	Addiction Recovery  Assessment and plan of treatment:	Please follow up with PMD, please continue to stop using illicit drugs and follow up with  Secondary Diagnosis:	Seizure  Goal:	Clinical Followup  Assessment and plan of treatment:	Please follow up with your PMD and continue your home meds  Secondary Diagnosis:	Septic pulmonary embolism without acute cor pulmonale  Goal:	Clinical Followup  Assessment and plan of treatment:	You were recently admitted to [facility] for evaluation of fevers, had VIOLETA done and was diagnosed with MSSA bacteremia complicated by TV IE and septic emboli to the lungs. You have no respiratory symptoms during admission at [facility]. Please follow up with your PMD and continue your antibiotics as directed  Secondary Diagnosis:	Opacity of lung on imaging study  Goal:	Clinical Followup  Assessment and plan of treatment:	You had a right middle lobe opacity on chest xray, which sometimes may indicate pneumonia. However, you are afebrile and have no symptoms typical of pneumonia, therefore imaging finding is likely more due to septic emboli of lungs which are being treated with antibiotics. Please follow up with your PMD.

## 2018-05-08 NOTE — DISCHARGE NOTE ADULT - ADDITIONAL INSTRUCTIONS
PMD, Cardiology, Cardiothoracic Surgery, Infectious Disease, Gastroenterology Dr. Light [address] [place], [place] [id number]  [phone]

## 2018-05-08 NOTE — DISCHARGE NOTE ADULT - REASON FOR ADMISSION
Pt states she was admitted Pt at [facility] for IV abx due to endocarditis, she left AMA due to their inability to obtain IV access and they refused to place PICC line.

## 2018-05-08 NOTE — DISCHARGE NOTE ADULT - PLAN OF CARE
You were recently admitted to [facility] for evaluation of fevers, had VIOLETA done and was diagnosed with MSSA bacteremia complicated by TV IE and septic emboli to the lungs. You were started on Oxacillin, though left AMA because as per patient PICC line was being delayed for no reason. Pt was also found to have Hep C while at [facility]. You were continued on Nafcillin 2g IV Q4H while inpatient at [facility]. Infectious disease specialist recommended discharge with Dicloxacillin 500mg Q6H and Probenecid 500mg Q12H  until 5/22/18.  Total duration of antibiotics for 6 weeks (4/9/18-5/22/18). It was recommended not to place PICC line at this time, as nursing [facility] that case management reached out to were not accepting patient with substance abuse history and PICC line, and patient already has received 4 weeks of IV antibiotics. Please follow up as an outpatient with Dr. Joiner from Infectious Disease. As an inpatient, blood cultures have been negative for 3 days. Please also follow up with Cardiology, Dr. Presley and Cardiothoracic Surgeon (CTS). You reported that you would follow up with CTS at [facility], and that CTS will not perform surgery until Hepatitis C is treated. Clinical Followup Please follow up with Gastroenterology as an outpatient, Ultrasound of abdomen and liver was unremarkable. Addiction Recovery Please follow up with PMD, please continue to stop using illicit drugs and follow up with Please follow up with your PMD and continue your home meds You were recently admitted to [facility] for evaluation of fevers, had VIOLETA done and was diagnosed with MSSA bacteremia complicated by TV IE and septic emboli to the lungs. You have no respiratory symptoms during admission at [facility]. Please follow up with your PMD and continue your antibiotics as directed You had a right middle lobe opacity on chest xray, which sometimes may indicate pneumonia. However, you are afebrile and have no symptoms typical of pneumonia, therefore imaging finding is likely more due to septic emboli of lungs which are being treated with antibiotics. Please follow up with your PMD.

## 2018-05-08 NOTE — DISCHARGE NOTE ADULT - MEDICATION SUMMARY - MEDICATIONS TO TAKE
I will START or STAY ON the medications listed below when I get home from the hospital:    Keppra 500 mg oral tablet  -- 1 tab(s) by mouth 2 times a day  -- Indication: For Seizure    Paxil 10 mg oral tablet  -- 1 tab(s) by mouth once a day  -- Indication: For Bipolar disorder, current episode mixed, severe, without psychotic features    probenecid 500 mg oral tablet  -- 1 tab(s) by mouth every 12 hours   -- It is very important that you take or use this exactly as directed.  Do not skip doses or discontinue unless directed by your doctor.  Medication should be taken with plenty of water.  Obtain medical advice before taking any non-prescription drugs as some may affect the action of this medication.    -- Indication: For Endocarditis    SEROquel 25 mg oral tablet  -- 1 tab(s) by mouth 3 times a day, As Needed  -- Indication: For Bipolar disorder, current episode mixed, severe, without psychotic features    SEROquel  mg oral tablet, extended release  -- 1 tab(s) by mouth once a day (in the evening)  -- Indication: For Bipolar disorder, current episode mixed, severe, without psychotic features    dicloxacillin 500 mg oral capsule  -- 1 cap(s) by mouth every 6 hours   -- Finish all this medication unless otherwise directed by prescriber.  Take medication on an empty stomach 1 hour before or 2 to 3 hours after a meal unless otherwise directed by your doctor.    -- Indication: For Endocarditis

## 2018-05-08 NOTE — DISCHARGE NOTE ADULT - CARE PROVIDER_API CALL
Chuck Young), Internal Medicine  [address] [place] [address]  Phone: [phone]  Fax: [phone]    Rusty Presley), Cardiovascular Disease; Nuclear Cardiology  [address] [place] [address]  [address]  [place] [address]  Phone: [phone]  Fax: [phone]    Amin, Mohsena F (MD), Infectious Disease; Internal Medicine  [address] [place] [address]  Phone: [phone]  Fax: [phone]    [facility],   Patient reports she has card with name and contact info at home, will follow up with CTS there.  Phone: (   )    -  Fax: (   )    -

## 2018-05-10 DIAGNOSIS — G40.909 EPILEPSY, UNSPECIFIED, NOT INTRACTABLE, WITHOUT STATUS EPILEPTICUS: ICD-10-CM

## 2018-05-10 DIAGNOSIS — I33.0 ACUTE AND SUBACUTE INFECTIVE ENDOCARDITIS: ICD-10-CM

## 2018-05-10 DIAGNOSIS — F31.9 BIPOLAR DISORDER, UNSPECIFIED: ICD-10-CM

## 2018-05-10 DIAGNOSIS — I38 ENDOCARDITIS, VALVE UNSPECIFIED: ICD-10-CM

## 2018-05-10 DIAGNOSIS — R78.81 BACTEREMIA: ICD-10-CM

## 2018-05-10 DIAGNOSIS — B95.61 METHICILLIN SUSCEPTIBLE STAPHYLOCOCCUS AUREUS INFECTION AS THE CAUSE OF DISEASES CLASSIFIED ELSEWHERE: ICD-10-CM

## 2018-05-10 DIAGNOSIS — I26.90 SEPTIC PULMONARY EMBOLISM WITHOUT ACUTE COR PULMONALE: ICD-10-CM

## 2018-05-10 DIAGNOSIS — F11.99 OPIOID USE, UNSPECIFIED WITH UNSPECIFIED OPIOID-INDUCED DISORDER: ICD-10-CM

## 2018-05-10 DIAGNOSIS — F14.99 COCAINE USE, UNSPECIFIED WITH UNSPECIFIED COCAINE-INDUCED DISORDER: ICD-10-CM

## 2018-05-10 DIAGNOSIS — B18.2 CHRONIC VIRAL HEPATITIS C: ICD-10-CM

## 2018-05-10 DIAGNOSIS — F17.210 NICOTINE DEPENDENCE, CIGARETTES, UNCOMPLICATED: ICD-10-CM

## 2018-05-10 LAB
CULTURE RESULTS: SIGNIFICANT CHANGE UP
SPECIMEN SOURCE: SIGNIFICANT CHANGE UP

## 2018-05-14 LAB
AMOBARBITAL UR CFM-MCNC: SIGNIFICANT CHANGE UP CD:170654348
AMOBARBITAL UR CFM-MCNC: SIGNIFICANT CHANGE UP NG/ML
AMPHETAMINES BLD QL SCN: NEGATIVE — SIGNIFICANT CHANGE UP
BARBITURATES SERPLBLD QL: SIGNIFICANT CHANGE UP
BENZODIAZAPINES, SERUM: NEGATIVE — SIGNIFICANT CHANGE UP
BUTABARBITAL UR QL SCN: SIGNIFICANT CHANGE UP CD:170654348
BUTABARBITAL UR QL SCN: SIGNIFICANT CHANGE UP NG/ML
BUTALBITAL UR QL SCN: SIGNIFICANT CHANGE UP CD:170654348
BUTALBITAL UR QL SCN: SIGNIFICANT CHANGE UP NG/ML
CANNABINOIDS SERPLBLD QL SCN: NEGATIVE — SIGNIFICANT CHANGE UP
COCAINE+BZE SERPLBLD QL SCN: SIGNIFICANT CHANGE UP
METHADONE SERPL-MCNC: NEGATIVE — SIGNIFICANT CHANGE UP
OPIATES SERPL QL: NEGATIVE — SIGNIFICANT CHANGE UP
PCP BLD QL SCN: NEGATIVE — SIGNIFICANT CHANGE UP
PENTOBARB UR QL SCN: SIGNIFICANT CHANGE UP CD:170654348
PENTOBARB UR QL SCN: SIGNIFICANT CHANGE UP NG/ML
PHENOBARBITAL UR CONFIRMATION: 2054 NG/ML — SIGNIFICANT CHANGE UP
PHENOBARBITAL UR QUANT: 4108 CD:170654348 — SIGNIFICANT CHANGE UP
PROPOXYPHENE, SERUM: NEGATIVE — SIGNIFICANT CHANGE UP
RESULT: (no result)
SECOBARBITAL UR CFM-MCNC: SIGNIFICANT CHANGE UP CD:170654348
SECOBARBITAL UR CFM-MCNC: SIGNIFICANT CHANGE UP NG/ML

## 2019-02-04 NOTE — PRE-OP CHECKLIST - ORDERS/MEDICATION ADMINISTRATION RECORD ON CHART
done Clinical Notes (To The Lab): Standing order. Start: 2/2019 End: 2/2020 Bill For Surgical Tray: no Billing Type: Third-Party Bill Performing Laboratory: [id number]

## 2020-07-26 NOTE — ED ADULT NURSE NOTE - ED STAT RN HAND OFF
15y M with R heel stress fracture diagnosed in June on MRI, had been in a boot, then cleared July 15th, no longer in boot. Dr. Jurado (podiatrist). Today was at baseball and ran to first base, then felt severe foot pain, slightly different location than when he had the pain in the heel. Pain is located to superior-medial aspect of heel, Took Motrin prior to arrival.
Handoff

## 2022-03-10 NOTE — PATIENT PROFILE ADULT. - STREET DRUG/MEDICATION/INHALANT, LONGEST SOBRIETY PERIOD, PROFILE
Date last seen: 02/21/22    Date of next visit: 08/22/22    Med Requested: Outpatient Medication Detail     Disp Refills Start End    Victoza 18 MG/3ML pen-injector 9 mL 3 11/15/2021       Sig: ADMINISTER 1.8 MG UNDER THE SKIN DAILY        Class: Eprescribe      LOV note reviewed. Refilled per protocol.      
4 months

## 2023-05-18 NOTE — PATIENT PROFILE ADULT. - NSSUBSTANCEUSE_GEN_ALL_CORE_SD
street drug/inhalant/medication abuse Cimetidine Pregnancy And Lactation Text: This medication is Pregnancy Category B and is considered safe during pregnancy. It is also excreted in breast milk and breast feeding isn't recommended.